# Patient Record
Sex: MALE | Race: WHITE | URBAN - METROPOLITAN AREA
[De-identification: names, ages, dates, MRNs, and addresses within clinical notes are randomized per-mention and may not be internally consistent; named-entity substitution may affect disease eponyms.]

---

## 2021-04-28 ENCOUNTER — OFFICE VISIT (OUTPATIENT)
Dept: URBAN - METROPOLITAN AREA CLINIC 91 | Facility: CLINIC | Age: 76
End: 2021-04-28
Payer: MEDICARE

## 2021-04-28 DIAGNOSIS — H43.813 VITREOUS DEGENERATION, BILATERAL: ICD-10-CM

## 2021-04-28 DIAGNOSIS — H35.033 HYPERTENSIVE RETINOPATHY, BILATERAL: Primary | ICD-10-CM

## 2021-04-28 DIAGNOSIS — H25.13 AGE-RELATED NUCLEAR CATARACT, BILATERAL: ICD-10-CM

## 2021-04-28 DIAGNOSIS — H52.4 PRESBYOPIA: ICD-10-CM

## 2021-04-28 DIAGNOSIS — H02.834 DERMATOCHALASIS OF LEFT UPPER EYELID: ICD-10-CM

## 2021-04-28 DIAGNOSIS — H02.831 DERMATOCHALASIS OF RIGHT UPPER EYELID: ICD-10-CM

## 2021-04-28 PROCEDURE — 99214 OFFICE O/P EST MOD 30 MIN: CPT | Performed by: OPHTHALMOLOGY

## 2021-04-28 ASSESSMENT — INTRAOCULAR PRESSURE
OD: 14
OS: 12

## 2021-04-28 ASSESSMENT — VISUAL ACUITY
OS: 20/25
OD: 20/20

## 2021-05-21 ENCOUNTER — OFFICE VISIT (OUTPATIENT)
Dept: URBAN - METROPOLITAN AREA CLINIC 91 | Facility: CLINIC | Age: 76
End: 2021-05-21
Payer: MEDICARE

## 2021-05-21 PROCEDURE — V2799 MISC VISION ITEM OR SERVICE: HCPCS | Performed by: OPHTHALMOLOGY

## 2021-05-21 ASSESSMENT — VISUAL ACUITY
OS: 20/25
OD: 20/20

## 2021-07-29 ENCOUNTER — ANESTHESIA (OUTPATIENT)
Dept: SURGERY | Facility: MEDICAL CENTER | Age: 76
DRG: 335 | End: 2021-07-29
Payer: MEDICARE

## 2021-07-29 ENCOUNTER — ANESTHESIA EVENT (OUTPATIENT)
Dept: SURGERY | Facility: MEDICAL CENTER | Age: 76
DRG: 335 | End: 2021-07-29
Payer: MEDICARE

## 2021-07-29 ENCOUNTER — HOSPITAL ENCOUNTER (INPATIENT)
Facility: MEDICAL CENTER | Age: 76
LOS: 4 days | DRG: 335 | End: 2021-08-02
Attending: INTERNAL MEDICINE | Admitting: STUDENT IN AN ORGANIZED HEALTH CARE EDUCATION/TRAINING PROGRAM
Payer: MEDICARE

## 2021-07-29 ENCOUNTER — HOSPITAL ENCOUNTER (OUTPATIENT)
Dept: RADIOLOGY | Facility: MEDICAL CENTER | Age: 76
End: 2021-07-29

## 2021-07-29 ENCOUNTER — APPOINTMENT (OUTPATIENT)
Dept: RADIOLOGY | Facility: MEDICAL CENTER | Age: 76
DRG: 335 | End: 2021-07-29
Attending: STUDENT IN AN ORGANIZED HEALTH CARE EDUCATION/TRAINING PROGRAM
Payer: MEDICARE

## 2021-07-29 DIAGNOSIS — R53.1 WEAKNESS: ICD-10-CM

## 2021-07-29 DIAGNOSIS — K56.609 SMALL BOWEL OBSTRUCTION (HCC): ICD-10-CM

## 2021-07-29 PROBLEM — G89.29 CHRONIC BACK PAIN: Status: ACTIVE | Noted: 2021-07-29

## 2021-07-29 PROBLEM — G47.00 INSOMNIA: Status: ACTIVE | Noted: 2021-07-29

## 2021-07-29 PROBLEM — J96.01 ACUTE RESPIRATORY FAILURE WITH HYPOXIA (HCC): Status: ACTIVE | Noted: 2021-07-29

## 2021-07-29 PROBLEM — D72.829 LEUKOCYTOSIS: Status: ACTIVE | Noted: 2021-07-29

## 2021-07-29 PROBLEM — M54.9 CHRONIC BACK PAIN: Status: ACTIVE | Noted: 2021-07-29

## 2021-07-29 PROBLEM — A41.9 SEPSIS (HCC): Status: ACTIVE | Noted: 2021-07-29

## 2021-07-29 LAB
ALBUMIN SERPL BCP-MCNC: 3.8 G/DL (ref 3.2–4.9)
ALBUMIN/GLOB SERPL: 1.3 G/DL
ALP SERPL-CCNC: 85 U/L (ref 30–99)
ALT SERPL-CCNC: 19 U/L (ref 2–50)
ANION GAP SERPL CALC-SCNC: 9 MMOL/L (ref 7–16)
APPEARANCE UR: CLEAR
AST SERPL-CCNC: 19 U/L (ref 12–45)
BASOPHILS # BLD AUTO: 0.3 % (ref 0–1.8)
BASOPHILS # BLD: 0.03 K/UL (ref 0–0.12)
BILIRUB SERPL-MCNC: 0.8 MG/DL (ref 0.1–1.5)
BILIRUB UR QL STRIP.AUTO: ABNORMAL
BUN SERPL-MCNC: 41 MG/DL (ref 8–22)
CALCIUM SERPL-MCNC: 8.9 MG/DL (ref 8.5–10.5)
CHLORIDE SERPL-SCNC: 105 MMOL/L (ref 96–112)
CO2 SERPL-SCNC: 25 MMOL/L (ref 20–33)
COLOR UR: ABNORMAL
CREAT SERPL-MCNC: 0.88 MG/DL (ref 0.5–1.4)
EKG IMPRESSION: NORMAL
EOSINOPHIL # BLD AUTO: 0.02 K/UL (ref 0–0.51)
EOSINOPHIL NFR BLD: 0.2 % (ref 0–6.9)
ERYTHROCYTE [DISTWIDTH] IN BLOOD BY AUTOMATED COUNT: 46.4 FL (ref 35.9–50)
FLUAV RNA SPEC QL NAA+PROBE: NEGATIVE
FLUBV RNA SPEC QL NAA+PROBE: NEGATIVE
GLOBULIN SER CALC-MCNC: 3 G/DL (ref 1.9–3.5)
GLUCOSE BLD-MCNC: 140 MG/DL (ref 65–99)
GLUCOSE BLD-MCNC: 142 MG/DL (ref 65–99)
GLUCOSE BLD-MCNC: 145 MG/DL (ref 65–99)
GLUCOSE SERPL-MCNC: 139 MG/DL (ref 65–99)
GLUCOSE UR STRIP.AUTO-MCNC: NEGATIVE MG/DL
HCT VFR BLD AUTO: 52.3 % (ref 42–52)
HGB BLD-MCNC: 17.3 G/DL (ref 14–18)
IMM GRANULOCYTES # BLD AUTO: 0.05 K/UL (ref 0–0.11)
IMM GRANULOCYTES NFR BLD AUTO: 0.6 % (ref 0–0.9)
INR PPP: 1.06 (ref 0.87–1.13)
KETONES UR STRIP.AUTO-MCNC: ABNORMAL MG/DL
LACTATE BLD-SCNC: 1.5 MMOL/L (ref 0.5–2)
LACTATE BLD-SCNC: 1.6 MMOL/L (ref 0.5–2)
LACTATE BLD-SCNC: 1.7 MMOL/L (ref 0.5–2)
LACTATE BLD-SCNC: 1.8 MMOL/L (ref 0.5–2)
LEUKOCYTE ESTERASE UR QL STRIP.AUTO: NEGATIVE
LYMPHOCYTES # BLD AUTO: 0.42 K/UL (ref 1–4.8)
LYMPHOCYTES NFR BLD: 4.7 % (ref 22–41)
MAGNESIUM SERPL-MCNC: 2.2 MG/DL (ref 1.5–2.5)
MAGNESIUM SERPL-MCNC: 2.2 MG/DL (ref 1.5–2.5)
MAGNESIUM SERPL-MCNC: 2.3 MG/DL (ref 1.5–2.5)
MCH RBC QN AUTO: 29.3 PG (ref 27–33)
MCHC RBC AUTO-ENTMCNC: 33.1 G/DL (ref 33.7–35.3)
MCV RBC AUTO: 88.5 FL (ref 81.4–97.8)
MICRO URNS: ABNORMAL
MONOCYTES # BLD AUTO: 0.76 K/UL (ref 0–0.85)
MONOCYTES NFR BLD AUTO: 8.4 % (ref 0–13.4)
NEUTROPHILS # BLD AUTO: 7.75 K/UL (ref 1.82–7.42)
NEUTROPHILS NFR BLD: 85.8 % (ref 44–72)
NITRITE UR QL STRIP.AUTO: NEGATIVE
NRBC # BLD AUTO: 0 K/UL
NRBC BLD-RTO: 0 /100 WBC
PH UR STRIP.AUTO: 5.5 [PH] (ref 5–8)
PHOSPHATE SERPL-MCNC: 2.8 MG/DL (ref 2.5–4.5)
PHOSPHATE SERPL-MCNC: 2.9 MG/DL (ref 2.5–4.5)
PLATELET # BLD AUTO: 151 K/UL (ref 164–446)
PMV BLD AUTO: 9.8 FL (ref 9–12.9)
POTASSIUM SERPL-SCNC: 4.2 MMOL/L (ref 3.6–5.5)
PROCALCITONIN SERPL-MCNC: 0.69 NG/ML
PROT SERPL-MCNC: 6.8 G/DL (ref 6–8.2)
PROT UR QL STRIP: NEGATIVE MG/DL
PROTHROMBIN TIME: 13.5 SEC (ref 12–14.6)
RBC # BLD AUTO: 5.91 M/UL (ref 4.7–6.1)
RBC UR QL AUTO: NEGATIVE
RSV RNA SPEC QL NAA+PROBE: NEGATIVE
SARS-COV-2 RNA RESP QL NAA+PROBE: NOTDETECTED
SODIUM SERPL-SCNC: 139 MMOL/L (ref 135–145)
SP GR UR STRIP.AUTO: 1.04
SPECIMEN SOURCE: NORMAL
UROBILINOGEN UR STRIP.AUTO-MCNC: 0.2 MG/DL
WBC # BLD AUTO: 9 K/UL (ref 4.8–10.8)

## 2021-07-29 PROCEDURE — 80053 COMPREHEN METABOLIC PANEL: CPT

## 2021-07-29 PROCEDURE — C1765 ADHESION BARRIER: HCPCS | Performed by: SURGERY

## 2021-07-29 PROCEDURE — 0DN80ZZ RELEASE SMALL INTESTINE, OPEN APPROACH: ICD-10-PCS | Performed by: SURGERY

## 2021-07-29 PROCEDURE — 700101 HCHG RX REV CODE 250: Performed by: STUDENT IN AN ORGANIZED HEALTH CARE EDUCATION/TRAINING PROGRAM

## 2021-07-29 PROCEDURE — 93005 ELECTROCARDIOGRAM TRACING: CPT | Performed by: STUDENT IN AN ORGANIZED HEALTH CARE EDUCATION/TRAINING PROGRAM

## 2021-07-29 PROCEDURE — 501838 HCHG SUTURE GENERAL: Performed by: SURGERY

## 2021-07-29 PROCEDURE — 82962 GLUCOSE BLOOD TEST: CPT

## 2021-07-29 PROCEDURE — C9803 HOPD COVID-19 SPEC COLLECT: HCPCS | Performed by: SURGERY

## 2021-07-29 PROCEDURE — 83605 ASSAY OF LACTIC ACID: CPT | Mod: 91

## 2021-07-29 PROCEDURE — 84145 PROCALCITONIN (PCT): CPT

## 2021-07-29 PROCEDURE — 0241U HCHG SARS-COV-2 COVID-19 NFCT DS RESP RNA 4 TRGT MIC: CPT

## 2021-07-29 PROCEDURE — 700111 HCHG RX REV CODE 636 W/ 250 OVERRIDE (IP)

## 2021-07-29 PROCEDURE — 99223 1ST HOSP IP/OBS HIGH 75: CPT | Mod: AI | Performed by: STUDENT IN AN ORGANIZED HEALTH CARE EDUCATION/TRAINING PROGRAM

## 2021-07-29 PROCEDURE — 160031 HCHG SURGERY MINUTES - 1ST 30 MINS LEVEL 5: Performed by: SURGERY

## 2021-07-29 PROCEDURE — 87040 BLOOD CULTURE FOR BACTERIA: CPT

## 2021-07-29 PROCEDURE — 160042 HCHG SURGERY MINUTES - EA ADDL 1 MIN LEVEL 5: Performed by: SURGERY

## 2021-07-29 PROCEDURE — 85610 PROTHROMBIN TIME: CPT

## 2021-07-29 PROCEDURE — 700111 HCHG RX REV CODE 636 W/ 250 OVERRIDE (IP): Performed by: ANESTHESIOLOGY

## 2021-07-29 PROCEDURE — 36415 COLL VENOUS BLD VENIPUNCTURE: CPT

## 2021-07-29 PROCEDURE — A9270 NON-COVERED ITEM OR SERVICE: HCPCS | Performed by: STUDENT IN AN ORGANIZED HEALTH CARE EDUCATION/TRAINING PROGRAM

## 2021-07-29 PROCEDURE — 84100 ASSAY OF PHOSPHORUS: CPT | Mod: 91

## 2021-07-29 PROCEDURE — 3E0T3BZ INTRODUCTION OF ANESTHETIC AGENT INTO PERIPHERAL NERVES AND PLEXI, PERCUTANEOUS APPROACH: ICD-10-PCS | Performed by: SURGERY

## 2021-07-29 PROCEDURE — 700105 HCHG RX REV CODE 258: Performed by: STUDENT IN AN ORGANIZED HEALTH CARE EDUCATION/TRAINING PROGRAM

## 2021-07-29 PROCEDURE — 160036 HCHG PACU - EA ADDL 30 MINS PHASE I: Performed by: SURGERY

## 2021-07-29 PROCEDURE — 81003 URINALYSIS AUTO W/O SCOPE: CPT

## 2021-07-29 PROCEDURE — 93010 ELECTROCARDIOGRAM REPORT: CPT | Performed by: INTERNAL MEDICINE

## 2021-07-29 PROCEDURE — 85025 COMPLETE CBC W/AUTO DIFF WBC: CPT

## 2021-07-29 PROCEDURE — 770001 HCHG ROOM/CARE - MED/SURG/GYN PRIV*

## 2021-07-29 PROCEDURE — 160048 HCHG OR STATISTICAL LEVEL 1-5: Performed by: SURGERY

## 2021-07-29 PROCEDURE — 83735 ASSAY OF MAGNESIUM: CPT | Mod: 91

## 2021-07-29 PROCEDURE — 700111 HCHG RX REV CODE 636 W/ 250 OVERRIDE (IP): Performed by: STUDENT IN AN ORGANIZED HEALTH CARE EDUCATION/TRAINING PROGRAM

## 2021-07-29 PROCEDURE — 700102 HCHG RX REV CODE 250 W/ 637 OVERRIDE(OP): Performed by: STUDENT IN AN ORGANIZED HEALTH CARE EDUCATION/TRAINING PROGRAM

## 2021-07-29 PROCEDURE — 160035 HCHG PACU - 1ST 60 MINS PHASE I: Performed by: SURGERY

## 2021-07-29 PROCEDURE — 700101 HCHG RX REV CODE 250: Performed by: ANESTHESIOLOGY

## 2021-07-29 PROCEDURE — 700111 HCHG RX REV CODE 636 W/ 250 OVERRIDE (IP): Performed by: SURGERY

## 2021-07-29 PROCEDURE — 64488 TAP BLOCK BI INJECTION: CPT | Performed by: SURGERY

## 2021-07-29 PROCEDURE — 160009 HCHG ANES TIME/MIN: Performed by: SURGERY

## 2021-07-29 PROCEDURE — 160002 HCHG RECOVERY MINUTES (STAT): Performed by: SURGERY

## 2021-07-29 RX ORDER — ONDANSETRON 2 MG/ML
INJECTION INTRAMUSCULAR; INTRAVENOUS PRN
Status: DISCONTINUED | OUTPATIENT
Start: 2021-07-29 | End: 2021-07-29 | Stop reason: SURG

## 2021-07-29 RX ORDER — CEFAZOLIN SODIUM 1 G/3ML
INJECTION, POWDER, FOR SOLUTION INTRAMUSCULAR; INTRAVENOUS PRN
Status: DISCONTINUED | OUTPATIENT
Start: 2021-07-29 | End: 2021-07-29 | Stop reason: SURG

## 2021-07-29 RX ORDER — AMOXICILLIN 250 MG
2 CAPSULE ORAL 2 TIMES DAILY
Status: DISCONTINUED | OUTPATIENT
Start: 2021-07-29 | End: 2021-08-02 | Stop reason: HOSPADM

## 2021-07-29 RX ORDER — SUCCINYLCHOLINE CHLORIDE 20 MG/ML
INJECTION INTRAMUSCULAR; INTRAVENOUS PRN
Status: DISCONTINUED | OUTPATIENT
Start: 2021-07-29 | End: 2021-07-29 | Stop reason: SURG

## 2021-07-29 RX ORDER — SODIUM CHLORIDE, SODIUM LACTATE, POTASSIUM CHLORIDE, CALCIUM CHLORIDE 600; 310; 30; 20 MG/100ML; MG/100ML; MG/100ML; MG/100ML
INJECTION, SOLUTION INTRAVENOUS CONTINUOUS
Status: DISCONTINUED | OUTPATIENT
Start: 2021-07-29 | End: 2021-07-29 | Stop reason: HOSPADM

## 2021-07-29 RX ORDER — GABAPENTIN 400 MG/1
800 CAPSULE ORAL DAILY
COMMUNITY

## 2021-07-29 RX ORDER — LABETALOL HYDROCHLORIDE 5 MG/ML
5 INJECTION, SOLUTION INTRAVENOUS
Status: DISCONTINUED | OUTPATIENT
Start: 2021-07-29 | End: 2021-07-29 | Stop reason: HOSPADM

## 2021-07-29 RX ORDER — DEXTROSE AND SODIUM CHLORIDE 5; .9 G/100ML; G/100ML
INJECTION, SOLUTION INTRAVENOUS CONTINUOUS
Status: DISCONTINUED | OUTPATIENT
Start: 2021-07-29 | End: 2021-07-29

## 2021-07-29 RX ORDER — ACETAMINOPHEN 325 MG/1
650 TABLET ORAL EVERY 6 HOURS PRN
Status: DISCONTINUED | OUTPATIENT
Start: 2021-07-29 | End: 2021-08-02 | Stop reason: HOSPADM

## 2021-07-29 RX ORDER — M-VIT,TX,IRON,MINS/CALC/FOLIC 27MG-0.4MG
1 TABLET ORAL DAILY
COMMUNITY

## 2021-07-29 RX ORDER — KETAMINE HYDROCHLORIDE 50 MG/ML
INJECTION, SOLUTION INTRAMUSCULAR; INTRAVENOUS PRN
Status: DISCONTINUED | OUTPATIENT
Start: 2021-07-29 | End: 2021-07-29 | Stop reason: SURG

## 2021-07-29 RX ORDER — HYDROMORPHONE HYDROCHLORIDE 1 MG/ML
0.4 INJECTION, SOLUTION INTRAMUSCULAR; INTRAVENOUS; SUBCUTANEOUS
Status: DISCONTINUED | OUTPATIENT
Start: 2021-07-29 | End: 2021-07-29 | Stop reason: HOSPADM

## 2021-07-29 RX ORDER — HYDRALAZINE HYDROCHLORIDE 20 MG/ML
5 INJECTION INTRAMUSCULAR; INTRAVENOUS
Status: DISCONTINUED | OUTPATIENT
Start: 2021-07-29 | End: 2021-07-29 | Stop reason: HOSPADM

## 2021-07-29 RX ORDER — MAGNESIUM OXIDE 400 MG/1
400 TABLET ORAL DAILY
COMMUNITY

## 2021-07-29 RX ORDER — POLYETHYLENE GLYCOL 3350 17 G/17G
1 POWDER, FOR SOLUTION ORAL
Status: DISCONTINUED | OUTPATIENT
Start: 2021-07-29 | End: 2021-08-02 | Stop reason: HOSPADM

## 2021-07-29 RX ORDER — HYDROMORPHONE HYDROCHLORIDE 1 MG/ML
INJECTION, SOLUTION INTRAMUSCULAR; INTRAVENOUS; SUBCUTANEOUS
Status: COMPLETED
Start: 2021-07-29 | End: 2021-07-29

## 2021-07-29 RX ORDER — HYDROMORPHONE HYDROCHLORIDE 1 MG/ML
0.1 INJECTION, SOLUTION INTRAMUSCULAR; INTRAVENOUS; SUBCUTANEOUS
Status: DISCONTINUED | OUTPATIENT
Start: 2021-07-29 | End: 2021-07-29 | Stop reason: HOSPADM

## 2021-07-29 RX ORDER — ENALAPRILAT 1.25 MG/ML
1.25 INJECTION INTRAVENOUS EVERY 6 HOURS PRN
Status: DISCONTINUED | OUTPATIENT
Start: 2021-07-29 | End: 2021-08-02 | Stop reason: HOSPADM

## 2021-07-29 RX ORDER — ASPIRIN 81 MG/1
81 TABLET, CHEWABLE ORAL DAILY
Status: DISCONTINUED | OUTPATIENT
Start: 2021-07-29 | End: 2021-07-29

## 2021-07-29 RX ORDER — ONDANSETRON 2 MG/ML
4 INJECTION INTRAMUSCULAR; INTRAVENOUS
Status: DISCONTINUED | OUTPATIENT
Start: 2021-07-29 | End: 2021-07-29 | Stop reason: HOSPADM

## 2021-07-29 RX ORDER — HEPARIN SODIUM 5000 [USP'U]/ML
5000 INJECTION, SOLUTION INTRAVENOUS; SUBCUTANEOUS EVERY 8 HOURS
Status: DISCONTINUED | OUTPATIENT
Start: 2021-07-29 | End: 2021-07-29

## 2021-07-29 RX ORDER — DEXTROSE MONOHYDRATE 25 G/50ML
50 INJECTION, SOLUTION INTRAVENOUS
Status: DISCONTINUED | OUTPATIENT
Start: 2021-07-29 | End: 2021-08-02 | Stop reason: HOSPADM

## 2021-07-29 RX ORDER — DEXAMETHASONE SODIUM PHOSPHATE 4 MG/ML
INJECTION, SOLUTION INTRA-ARTICULAR; INTRALESIONAL; INTRAMUSCULAR; INTRAVENOUS; SOFT TISSUE PRN
Status: DISCONTINUED | OUTPATIENT
Start: 2021-07-29 | End: 2021-07-29 | Stop reason: SURG

## 2021-07-29 RX ORDER — ZOLPIDEM TARTRATE 5 MG/1
5 TABLET ORAL NIGHTLY PRN
Status: DISCONTINUED | OUTPATIENT
Start: 2021-07-29 | End: 2021-08-02 | Stop reason: HOSPADM

## 2021-07-29 RX ORDER — SODIUM CHLORIDE, SODIUM LACTATE, POTASSIUM CHLORIDE, CALCIUM CHLORIDE 600; 310; 30; 20 MG/100ML; MG/100ML; MG/100ML; MG/100ML
INJECTION, SOLUTION INTRAVENOUS CONTINUOUS
Status: DISCONTINUED | OUTPATIENT
Start: 2021-07-29 | End: 2021-07-31

## 2021-07-29 RX ORDER — CYCLOBENZAPRINE HCL 10 MG
10 TABLET ORAL
COMMUNITY

## 2021-07-29 RX ORDER — HYDROMORPHONE HYDROCHLORIDE 1 MG/ML
1 INJECTION, SOLUTION INTRAMUSCULAR; INTRAVENOUS; SUBCUTANEOUS
Status: DISCONTINUED | OUTPATIENT
Start: 2021-07-29 | End: 2021-08-02 | Stop reason: HOSPADM

## 2021-07-29 RX ORDER — LABETALOL HYDROCHLORIDE 5 MG/ML
10 INJECTION, SOLUTION INTRAVENOUS EVERY 4 HOURS PRN
Status: DISCONTINUED | OUTPATIENT
Start: 2021-07-29 | End: 2021-08-02 | Stop reason: HOSPADM

## 2021-07-29 RX ORDER — HYDROMORPHONE HYDROCHLORIDE 1 MG/ML
0.2 INJECTION, SOLUTION INTRAMUSCULAR; INTRAVENOUS; SUBCUTANEOUS
Status: DISCONTINUED | OUTPATIENT
Start: 2021-07-29 | End: 2021-07-29 | Stop reason: HOSPADM

## 2021-07-29 RX ORDER — SODIUM CHLORIDE, SODIUM LACTATE, POTASSIUM CHLORIDE, AND CALCIUM CHLORIDE .6; .31; .03; .02 G/100ML; G/100ML; G/100ML; G/100ML
500 INJECTION, SOLUTION INTRAVENOUS
Status: DISCONTINUED | OUTPATIENT
Start: 2021-07-29 | End: 2021-08-02 | Stop reason: HOSPADM

## 2021-07-29 RX ORDER — MONTELUKAST SODIUM 10 MG/1
10 TABLET ORAL DAILY
COMMUNITY

## 2021-07-29 RX ORDER — PANTOPRAZOLE SODIUM 40 MG/1
40 TABLET, DELAYED RELEASE ORAL DAILY
COMMUNITY

## 2021-07-29 RX ORDER — LIDOCAINE HYDROCHLORIDE 20 MG/ML
INJECTION, SOLUTION EPIDURAL; INFILTRATION; INTRACAUDAL; PERINEURAL PRN
Status: DISCONTINUED | OUTPATIENT
Start: 2021-07-29 | End: 2021-07-29 | Stop reason: SURG

## 2021-07-29 RX ORDER — ASPIRIN 81 MG/1
81 TABLET, CHEWABLE ORAL DAILY
COMMUNITY

## 2021-07-29 RX ORDER — CHLORAL HYDRATE 500 MG
1000 CAPSULE ORAL
COMMUNITY

## 2021-07-29 RX ORDER — BUPIVACAINE HYDROCHLORIDE AND EPINEPHRINE 2.5; 5 MG/ML; UG/ML
INJECTION, SOLUTION EPIDURAL; INFILTRATION; INTRACAUDAL; PERINEURAL
Status: COMPLETED | OUTPATIENT
Start: 2021-07-29 | End: 2021-07-29

## 2021-07-29 RX ORDER — ZINC SULFATE 50(220)MG
25 CAPSULE ORAL DAILY
COMMUNITY

## 2021-07-29 RX ORDER — GABAPENTIN 400 MG/1
800 CAPSULE ORAL DAILY
Status: DISCONTINUED | OUTPATIENT
Start: 2021-07-29 | End: 2021-08-02 | Stop reason: HOSPADM

## 2021-07-29 RX ORDER — ONDANSETRON 4 MG/1
4 TABLET, ORALLY DISINTEGRATING ORAL EVERY 4 HOURS PRN
Status: DISCONTINUED | OUTPATIENT
Start: 2021-07-29 | End: 2021-08-02 | Stop reason: HOSPADM

## 2021-07-29 RX ORDER — MELOXICAM 7.5 MG/1
15 TABLET ORAL DAILY
Status: DISCONTINUED | OUTPATIENT
Start: 2021-07-29 | End: 2021-07-29

## 2021-07-29 RX ORDER — OXYCODONE HCL 5 MG/5 ML
5 SOLUTION, ORAL ORAL
Status: DISCONTINUED | OUTPATIENT
Start: 2021-07-29 | End: 2021-07-29 | Stop reason: HOSPADM

## 2021-07-29 RX ORDER — OXYCODONE HCL 5 MG/5 ML
10 SOLUTION, ORAL ORAL
Status: DISCONTINUED | OUTPATIENT
Start: 2021-07-29 | End: 2021-07-29 | Stop reason: HOSPADM

## 2021-07-29 RX ORDER — ONDANSETRON 2 MG/ML
4 INJECTION INTRAMUSCULAR; INTRAVENOUS EVERY 4 HOURS PRN
Status: DISCONTINUED | OUTPATIENT
Start: 2021-07-29 | End: 2021-08-02 | Stop reason: HOSPADM

## 2021-07-29 RX ORDER — MULTIVIT WITH MINERALS/LUTEIN
TABLET ORAL
COMMUNITY

## 2021-07-29 RX ORDER — BISACODYL 10 MG
10 SUPPOSITORY, RECTAL RECTAL
Status: DISCONTINUED | OUTPATIENT
Start: 2021-07-29 | End: 2021-08-02 | Stop reason: HOSPADM

## 2021-07-29 RX ORDER — MELOXICAM 7.5 MG/1
15 TABLET ORAL DAILY
Status: ON HOLD | COMMUNITY
End: 2021-08-02

## 2021-07-29 RX ADMIN — LIDOCAINE HYDROCHLORIDE 40 MG: 20 INJECTION, SOLUTION EPIDURAL; INFILTRATION; INTRACAUDAL at 12:26

## 2021-07-29 RX ADMIN — HYDROMORPHONE HYDROCHLORIDE 0.2 MG: 1 INJECTION, SOLUTION INTRAMUSCULAR; INTRAVENOUS; SUBCUTANEOUS at 13:42

## 2021-07-29 RX ADMIN — BUPIVACAINE HYDROCHLORIDE AND EPINEPHRINE 60 ML: 2.5; 5 INJECTION, SOLUTION EPIDURAL; INFILTRATION; INTRACAUDAL; PERINEURAL at 13:04

## 2021-07-29 RX ADMIN — CEFAZOLIN 2 G: 330 INJECTION, POWDER, FOR SOLUTION INTRAMUSCULAR; INTRAVENOUS at 12:29

## 2021-07-29 RX ADMIN — HYDROMORPHONE HYDROCHLORIDE 0.4 MG: 1 INJECTION, SOLUTION INTRAMUSCULAR; INTRAVENOUS; SUBCUTANEOUS at 13:52

## 2021-07-29 RX ADMIN — CEFTRIAXONE SODIUM 2 G: 10 INJECTION, POWDER, FOR SOLUTION INTRAVENOUS at 06:06

## 2021-07-29 RX ADMIN — SUGAMMADEX 200 MG: 100 INJECTION, SOLUTION INTRAVENOUS at 13:06

## 2021-07-29 RX ADMIN — PHENOL 1 SPRAY: 1.5 LIQUID ORAL at 08:05

## 2021-07-29 RX ADMIN — FENTANYL CITRATE 100 MCG: 50 INJECTION, SOLUTION INTRAMUSCULAR; INTRAVENOUS at 12:26

## 2021-07-29 RX ADMIN — DOCUSATE SODIUM 50 MG AND SENNOSIDES 8.6 MG 2 TABLET: 8.6; 5 TABLET, FILM COATED ORAL at 17:11

## 2021-07-29 RX ADMIN — HYDROMORPHONE HYDROCHLORIDE 0.4 MG: 1 INJECTION, SOLUTION INTRAMUSCULAR; INTRAVENOUS; SUBCUTANEOUS at 14:00

## 2021-07-29 RX ADMIN — HYDROMORPHONE HYDROCHLORIDE 0.4 MG: 1 INJECTION, SOLUTION INTRAMUSCULAR; INTRAVENOUS; SUBCUTANEOUS at 13:39

## 2021-07-29 RX ADMIN — METRONIDAZOLE 500 MG: 500 INJECTION, SOLUTION INTRAVENOUS at 22:41

## 2021-07-29 RX ADMIN — ROCURONIUM BROMIDE 40 MG: 10 INJECTION, SOLUTION INTRAVENOUS at 12:29

## 2021-07-29 RX ADMIN — DEXTROSE AND SODIUM CHLORIDE: 5; 900 INJECTION, SOLUTION INTRAVENOUS at 06:07

## 2021-07-29 RX ADMIN — HYDROMORPHONE HYDROCHLORIDE 0.4 MG: 1 INJECTION, SOLUTION INTRAMUSCULAR; INTRAVENOUS; SUBCUTANEOUS at 13:33

## 2021-07-29 RX ADMIN — SODIUM CHLORIDE, POTASSIUM CHLORIDE, SODIUM LACTATE AND CALCIUM CHLORIDE: 600; 310; 30; 20 INJECTION, SOLUTION INTRAVENOUS at 23:08

## 2021-07-29 RX ADMIN — HEPARIN SODIUM 5000 UNITS: 5000 INJECTION, SOLUTION INTRAVENOUS; SUBCUTANEOUS at 06:06

## 2021-07-29 RX ADMIN — PROPOFOL 100 MG: 10 INJECTION, EMULSION INTRAVENOUS at 12:26

## 2021-07-29 RX ADMIN — SODIUM CHLORIDE, POTASSIUM CHLORIDE, SODIUM LACTATE AND CALCIUM CHLORIDE: 600; 310; 30; 20 INJECTION, SOLUTION INTRAVENOUS at 08:04

## 2021-07-29 RX ADMIN — KETAMINE HYDROCHLORIDE 50 MG: 50 INJECTION INTRAMUSCULAR; INTRAVENOUS at 12:26

## 2021-07-29 RX ADMIN — HYDROMORPHONE HYDROCHLORIDE 0.2 MG: 1 INJECTION, SOLUTION INTRAMUSCULAR; INTRAVENOUS; SUBCUTANEOUS at 14:05

## 2021-07-29 RX ADMIN — SUCCINYLCHOLINE CHLORIDE 160 MG: 20 INJECTION, SOLUTION INTRAMUSCULAR; INTRAVENOUS; PARENTERAL at 12:26

## 2021-07-29 RX ADMIN — HYDROMORPHONE HYDROCHLORIDE 1 MG: 1 INJECTION, SOLUTION INTRAMUSCULAR; INTRAVENOUS; SUBCUTANEOUS at 23:02

## 2021-07-29 RX ADMIN — ONDANSETRON 4 MG: 2 INJECTION INTRAMUSCULAR; INTRAVENOUS at 13:02

## 2021-07-29 RX ADMIN — DEXAMETHASONE SODIUM PHOSPHATE 8 MG: 4 INJECTION, SOLUTION INTRA-ARTICULAR; INTRALESIONAL; INTRAMUSCULAR; INTRAVENOUS; SOFT TISSUE at 12:29

## 2021-07-29 RX ADMIN — FENTANYL CITRATE 50 MCG: 50 INJECTION INTRAMUSCULAR; INTRAVENOUS at 14:06

## 2021-07-29 RX ADMIN — METRONIDAZOLE 500 MG: 500 INJECTION, SOLUTION INTRAVENOUS at 06:06

## 2021-07-29 RX ADMIN — FENTANYL CITRATE 50 MCG: 50 INJECTION, SOLUTION INTRAMUSCULAR; INTRAVENOUS at 12:49

## 2021-07-29 RX ADMIN — METRONIDAZOLE 500 MG: 500 INJECTION, SOLUTION INTRAVENOUS at 15:23

## 2021-07-29 ASSESSMENT — ENCOUNTER SYMPTOMS
BRUISES/BLEEDS EASILY: 0
SPEECH CHANGE: 0
FLANK PAIN: 0
HEARTBURN: 1
BLURRED VISION: 0
DEPRESSION: 0
COUGH: 0
MYALGIAS: 0
SHORTNESS OF BREATH: 0
NAUSEA: 1
FALLS: 0
VOMITING: 1
ABDOMINAL PAIN: 1
DOUBLE VISION: 0
PALPITATIONS: 0
CHILLS: 0
HEADACHES: 0
DIARRHEA: 0
FOCAL WEAKNESS: 0
FEVER: 0
CONSTIPATION: 1
DIZZINESS: 0

## 2021-07-29 ASSESSMENT — LIFESTYLE VARIABLES
HAVE YOU EVER FELT YOU SHOULD CUT DOWN ON YOUR DRINKING: NO
AVERAGE NUMBER OF DAYS PER WEEK YOU HAVE A DRINK CONTAINING ALCOHOL: 0
TOTAL SCORE: 0
CONSUMPTION TOTAL: NEGATIVE
ALCOHOL_USE: NO
ON A TYPICAL DAY WHEN YOU DRINK ALCOHOL HOW MANY DRINKS DO YOU HAVE: 0
EVER HAD A DRINK FIRST THING IN THE MORNING TO STEADY YOUR NERVES TO GET RID OF A HANGOVER: NO
HAVE PEOPLE ANNOYED YOU BY CRITICIZING YOUR DRINKING: NO
TOTAL SCORE: 0
ON A TYPICAL DAY WHEN YOU DRINK ALCOHOL HOW MANY DRINKS DO YOU HAVE: 0
EVER FELT BAD OR GUILTY ABOUT YOUR DRINKING: NO
EVER HAD A DRINK FIRST THING IN THE MORNING TO STEADY YOUR NERVES TO GET RID OF A HANGOVER: NO
EVER FELT BAD OR GUILTY ABOUT YOUR DRINKING: NO
CONSUMPTION TOTAL: NEGATIVE
HOW MANY TIMES IN THE PAST YEAR HAVE YOU HAD 5 OR MORE DRINKS IN A DAY: 0
ALCOHOL_USE: NO
DOES PATIENT WANT TO STOP DRINKING: NO
HOW MANY TIMES IN THE PAST YEAR HAVE YOU HAD 5 OR MORE DRINKS IN A DAY: 0
AVERAGE NUMBER OF DAYS PER WEEK YOU HAVE A DRINK CONTAINING ALCOHOL: 0
TOTAL SCORE: 0
HAVE YOU EVER FELT YOU SHOULD CUT DOWN ON YOUR DRINKING: NO
HAVE PEOPLE ANNOYED YOU BY CRITICIZING YOUR DRINKING: NO
TOTAL SCORE: 0

## 2021-07-29 ASSESSMENT — COGNITIVE AND FUNCTIONAL STATUS - GENERAL
SUGGESTED CMS G CODE MODIFIER MOBILITY: CI
DAILY ACTIVITIY SCORE: 24
MOBILITY SCORE: 23
SUGGESTED CMS G CODE MODIFIER DAILY ACTIVITY: CH
CLIMB 3 TO 5 STEPS WITH RAILING: A LITTLE

## 2021-07-29 ASSESSMENT — PAIN DESCRIPTION - PAIN TYPE
TYPE: SURGICAL PAIN
TYPE: SURGICAL PAIN
TYPE: ACUTE PAIN
TYPE: SURGICAL PAIN
TYPE: ACUTE PAIN

## 2021-07-29 ASSESSMENT — PATIENT HEALTH QUESTIONNAIRE - PHQ9
SUM OF ALL RESPONSES TO PHQ9 QUESTIONS 1 AND 2: 0
2. FEELING DOWN, DEPRESSED, IRRITABLE, OR HOPELESS: NOT AT ALL
1. LITTLE INTEREST OR PLEASURE IN DOING THINGS: NOT AT ALL

## 2021-07-29 NOTE — H&P
Hospital Medicine History & Physical Note    Date of Service  7/29/2021    Primary Care Physician  No primary care provider on file.    Consultants  None    Code Status  Full Code    Chief Complaint  No chief complaint on file.    History of Presenting Illness  76M tsfer from [facility] by Dr. Burris [phone] for abdominal pain, distention nausea with vomiting, constipated without passing gas x 48 hours and a CT showing distal small bowel obstruction without free fluid or closed-loop or evidence of ischemic change. Patients' symptoms started 2 days ago and endorses epigastric tenderness. Wife bedside remarks he was much more distended before arrival at outside facility. NG tube suctioned out 1,000 L of gastric contents at outside facility and an additional 300mL during transport.    Upon admission, abnormal labs include tbili 1.9, wbc 18, HGB 18.89, glucose 165, bun 41 Cr 0.83, Lactic acid 2.5.    Will admit patient for SBO, consult surgery in AM, and leukocytosis concerning for infection.    I discussed the plan of care with patient.    Review of Systems  ROS  All systems reviewed and negative except as noted in HPI.    Past Medical History   has a past medical history of Moise's esophagus.    Surgical History   has a past surgical history that includes other abdominal surgery (2020).   Family History  family history includes Cancer in his brother, maternal grandmother, and sister.   Family history reviewed with patient. There is family history that is pertinent to the chief complaint.     Social History   reports that he has quit smoking. He has never used smokeless tobacco. He reports previous alcohol use. He reports that he does not use drugs.    Allergies  No Known Allergies    Medications  Prior to Admission Medications   Prescriptions Last Dose Informant Patient Reported? Taking?   Ascorbic Acid (VITAMIN C) 1000 MG Tab 7/28/2021 at Unknown time  Yes Yes   Sig: Take  by mouth.   Omega-3 Fatty  Acids (FISH OIL) 1000 MG Cap capsule 7/28/2021 at Unknown time  Yes Yes   Sig: Take 1,000 mg by mouth 1 time a day as needed.   Probiotic Product (FLORAJEN DIGESTION PO) 7/28/2021 at Unknown time  Yes Yes   Sig: Take  by mouth.   aspirin (ASA) 81 MG Chew Tab chewable tablet 7/28/2021 at Unknown time  Yes Yes   Sig: Chew 81 mg every day.   coenzyme Q-10 30 MG capsule 7/28/2021 at Unknown time  Yes Yes   Sig: Take  by mouth every day.   cyclobenzaprine (FLEXERIL) 10 mg Tab 7/28/2021 at Unknown time  Yes Yes   Sig: Take 10 mg by mouth 1 time a day as needed.   gabapentin (NEURONTIN) 400 MG Cap 7/28/2021 at Unknown time  Yes Yes   Sig: Take 800 mg by mouth every day.   magnesium oxide (MAG-OX) 400 MG Tab tablet 7/28/2021 at Unknown time  Yes Yes   Sig: Take 400 mg by mouth every day.   meloxicam (MOBIC) 7.5 MG Tab 7/28/2021 at Unknown time  Yes Yes   Sig: Take 15 mg by mouth every day.   montelukast (SINGULAIR) 10 MG Tab 7/28/2021 at Unknown time  Yes Yes   Sig: Take 10 mg by mouth every day.   pantoprazole (PROTONIX) 40 MG Tablet Delayed Response 7/28/2021 at Unknown time  Yes Yes   Sig: Take 40 mg by mouth every day.   therapeutic multivitamin-minerals (THERAGRAN-M) Tab 7/28/2021 at Unknown time  Yes Yes   Sig: Take 1 tablet by mouth every day.   zinc sulfate (ZINCATE) 220 (50 Zn) MG Cap 7/28/2021 at Unknown time  Yes Yes   Sig: Take 25 mg by mouth every day.      Facility-Administered Medications: None       Physical Exam  Temp:  [36.8 °C (98.2 °F)] 36.8 °C (98.2 °F)  Pulse:  [104] 104  Resp:  [14] 14  BP: (150)/(80) 150/80  SpO2:  [96 %] 96 %    Physical Exam  I have reviewed patients' vitals and Labs    Constitutional: Resting comfortably in NAD   HENT: Normocephalic, no obvious evidence of acute trauma. NG tube in place on low intermittent suction.  Eyes: No scleral icterus. Normal conjunctiva   Neck: Comfortable movement without any obvious restriction in the range of motion.  Cardiovascular: Upon ascultation I  appreciate a regular heart rhythm and a normal rate with no murmurs, rubs or gallops  Thorax & Lungs: No respiratory distress. No wheezing, rales or rhonchi heard on ausculation.  there is no obvious chest wall tenderness. I appreciate normal air movement throughout.   Abdomen: The abdomen is visibly distended with epigastric tenderness. No mass appreciated.  Skin: The exposed portions of skin reveal no obvious rash or other abnormalities.  Extremities/Musculoskeletal: no lower extremity edema with no asymmetry.  Neurologic: Alert & oriented. No focal deficits observed.   Psychiatric: Normal affect appropriate for the clinical situation.        Laboratory:          No results for input(s): ALTSGPT, ASTSGOT, ALKPHOSPHAT, TBILIRUBIN, DBILIRUBIN, GAMMAGT, AMYLASE, LIPASE, ALB, PREALBUMIN, GLUCOSE in the last 72 hours.      No results for input(s): NTPROBNP in the last 72 hours.      No results for input(s): TROPONINT in the last 72 hours.    Imaging:  DX-ABDOMEN FOR TUBE PLACEMENT    (Results Pending)       X-Ray:  I have personally reviewed the images and compared with prior images.  EKG:  I have personally reviewed the images and compared with prior images.    Assessment/Plan:  I anticipate this patient is appropriate for observation status at this time.    Sepsis (HCC)- (present on admission)  Assessment & Plan  This is Sepsis Present on admission  SIRS criteria identified on my evaluation include: Fever, with temperature greater than 101 deg F, Tachycardia, with heart rate greater than 90 BPM, Tachypnea, with respirations greater than 20 per minute, Leukocytosis, with WBC greater than 12,000 and Bandemia, greater than 10% bands  Source is SBO  Sepsis protocol initiated  Fluid resuscitation ordered per protocol  IV antibiotics as appropriate for source of sepsis  While organ dysfunction may be noted elsewhere in this problem list or in the chart, degree of organ dysfunction does not meet CMS criteria for severe  sepsis    Flagyl/Rocephin          Small bowel obstruction (HCC)- (present on admission)  Assessment & Plan  NPO  NG tube on intermittent suction  Surgery consult in AM hx of cholecystectomy in [place]  Bowel rest  Serial KUB      Insomnia- (present on admission)  Assessment & Plan  Benadryl/Ativan/melatonin    Chronic back pain- (present on admission)  Assessment & Plan  Pain management. Hold muscle relaxants for now.      VTE prophylaxis: SCDs/TEDs and heparin ppx

## 2021-07-29 NOTE — PROGRESS NOTES
Transfer Center Note    [facility] HOSPITALIST TRIAGE OFFICER DIRECT ADMISSION REPORT  Transferring facility: [facility]  Transferring physician: Dr Burris  Transferring facility/physician contact number: [phone]  Chief complaint: Abdominal pain, distal small bowel obstruction  Pertinent history & patient course: Patient reports abdominal pain distention, nausea without vomiting, constipated without passing gas x48 hours.  Pertinent imaging & lab results: CT reveals distal small bowel obstruction without free fluid or closed-loop or evidence of ischemic change  Code Status: Full code per transferring provider, I personally verified with the transferring provider patient's code status and the transferring provider has confirmed this with the patient.  Further work up or recommendations per triage officer prior to transfer: NG tube  Consultants called prior read 6 transfer and pertinent input from consultants: Discussed with general surgery Dr. Palomino  Patient accepted for transfer: Yes  Consultants to be called upon arrival: General surgery Dr. Palomino  Admission status: Inpatient.   [facility]  If ICU transfer, name of intensivist case discussed with and pertinent input from critical care:     Please inform the triage officer upon arrival of the patient to [facility] for assignment of a hospitalist to perform admission.     For any question or concerns regarding the care of this patient, please reach out to the assigned hospitalist.

## 2021-07-29 NOTE — OP REPORT
DATE OF SERVICE:  07/29/2021     PREOPERATIVE DIAGNOSIS:  Small-bowel obstruction.     POSTOPERATIVE DIAGNOSIS:  Small-bowel obstruction secondary to internal hernia   due to omental adhesion across the ileum.     OPERATION:  Exploratory laparotomy with reduction of internal hernia and   correction of volvulus with enterolysis.     SURGEON:  Zhou Landin MD     ANESTHESIOLOGIST:  Landen Westbrook MD     OPERATIVE NOTE:  The patient is a 76 years of age, presents with a small-bowel   obstruction, had previous laparoscopic surgery, but no open surgery.  The   patient had a complete obstruction on CT with no evidence of neoplasia.  The   patient was likely felt to have adhesions related small-bowel obstruction   unlikely to resolve with nasogastric decompression, cannot exclude the   possibility of small bowel tumor.  The patient was recommended to undergo   surgical intervention with exploratory laparotomy and consented to proceed.     DESCRIPTION OF PROCEDURE:  The patient was taken to the operating room, had   sequential stockings applied as anti-embolism prophylaxis.  Prophylactic   antibiotics were administered.  He was placed under anesthesia.  His abdomen   was prepped with ChloraPrep solution, sterile drapes were applied and a   time-out was affected.  A periumbilical midline incision was made and carried   down through skin and subcutaneous tissues to the level of fascia.  Hemostasis   was controlled with electrocautery.  The fascia was divided and the   peritoneum was divided.  The abdomen was entered.  There was nonbloody serous   fluid present within the abdominal cavity, which was evacuated.  The bowel was   eviscerated.  In the distal ileum, there was a single band adhesion from the   omentum across the bowel to the mesentery through which there was an internal   hernia and the volvulus.  The adhesive band was released.  The bowel appeared   to be viable.  There was no evidence of tumor.  The  patient had the viscera   returned to the abdominal cavity.  Seprafilm was used to cover the bowel and   the fascia was closed using interrupted Smead-Valente #1 Vicryl suture.  The   wound was irrigated and the skin was approximated using automatic stapling   device and the patient had a sterile dressing applied.  Dr. Westbrook plans to   do bilateral transversus abdominis blocks.  The patient will then be admitted   to postanesthesia recovery, returned to the surgical floor for postoperative   care.  Nasogastric decompression will continue, but likely can be discontinued   in the morning.  The patient tolerated the procedure well without apparent   complications.  Sponge, instrument, and needle counts reported as correct for   all phases of surgery.        ______________________________  MD JAYLA ORLANDO/KHLOE    DD:  07/29/2021 13:08  DT:  07/29/2021 14:34    Job#:  [id number]    CC:Landen Westbrook MD(User)

## 2021-07-29 NOTE — ASSESSMENT & PLAN NOTE
RESOLVED -- RA 8/1    Post op - 10-15L  On 2L 7/30 -- wean off  Elevated procal -- already on empiric abx

## 2021-07-29 NOTE — THERAPY
Physical Therapy Contact Note    PT consult received.  Pt w/ strict bed rest orders awaiting Sx consult.  Will f/u when appropriate.    Jay Gillespie PT, [phone]

## 2021-07-29 NOTE — CONSULTS
DATE OF SERVICE:  07/29/2021     REFERRING PHYSICIAN:  Doc Barnhart MD     REASON FOR CONSULTATION:  Small-bowel obstruction.     HISTORY OF PRESENT ILLNESS:  The patient, 76 years of age, presents with a   small-bowel obstruction.  He has been obstipated, had nausea and vomiting   despite nasogastric decompression for the last 48 hours.  He has not had   previous open abdominal surgery last year, he did require laparoscopic   cholecystectomy.  The patient has no abdominal wall hernias.  He does not have   symptoms of viral illness.  He does not appear to have provocation from food   poisoning.  The patient developed abdominal distention following eating   supper.  His bowels moved two days ago, but subsequently he has been   obstipated.  He has had hiccups.  He had an NG tube placed at an outside   hospital and was somewhat inexplicably referred to our hospital for care.  The   patient really does not have any significant serious medical illnesses and I   believe the hospital bed he has usually has surgical staff.  The patient was   seen and examined.  He does have some diffuse tenderness.  Of concern is white   count elevation of 18,000.  CT scan was reviewed and is consistent with   distal small-bowel obstruction.  He had colonoscopy in 2017, had one polyp   removed.  He has not had melena, hematochezia or chronic symptoms.  He has not   had symptoms suggestive of lymphoma.  There was no solid organ abnormality or   lymphadenopathy on CT.  The patient most likely has an obstruction secondary   to an adhesive band and appears to be a candidate for surgical intervention.     ALLERGIES:  He has no known drug allergies to medications.     CURRENT MEDICATIONS:  Included mostly multivitamins and then Flexeril and   Neurontin for low back pain, failed back syndrome.  He does take Mobic and   ____ Protonix.     He used to smoke, but does not smoke.  He does not use oxygen at home.     FAMILY HISTORY:  Positive for  cancer in his brother, maternal grandmother and   sister.     SOCIAL HISTORY:  The patient drinks rarely.     REVIEW OF SYSTEMS:  Otherwise negative and noncontributory.  He has no viral   symptoms.  It is not clear if the patient has been COVID tested.     PHYSICAL EXAMINATION:  VITAL SIGNS:  Essentially normal.  He does have a mild resting tachycardia.  HEENT:  Unremarkable.  He does have a nasogastric tube in place that is   draining bile.  NECK:  He has no palpable lymphadenopathy.  His thyroid is normal.  LUNGS:  Clear.  CARDIAC:  Normal.  ABDOMEN:  Slightly distended.  He does have some deep tenderness, but no   evidence of peritonitis.  There are no abdominal wall hernias.  EXTREMITIES:  Free of deformity.  NEUROLOGIC:  Neurologically he is intact.  I think he is hard of hearing, but   his affect is otherwise somewhat subdued.     LABORATORY DATA:  The patient's laboratories here are relatively benign.  He   apparently had a significant elevation of his hemoglobin and white count at   the outlying facility.     IMPRESSION:  At this time, a small-bowel obstruction.  I suspect the patient   in fact has a true obstruction from looking at his CT.  His white count is   elevated.  There could be compromised bowel.  The patient, I think, is a   candidate for surgical intervention today and we will pursue that.  The risks,   possible complications of operation will be explained to the patient in   detail prior to surgery and obtain his consent.        ______________________________  MD JAYLA ORLANDO/LUZ/RACHANA    DD:  07/29/2021 08:57  DT:  07/29/2021 10:01    Job#:  [id number]    CC:Doc Barnhart MD

## 2021-07-29 NOTE — THERAPY
Missed Therapy     Patient Name: Reynaldo Dodge  Age:  76 y.o., Sex:  male  Medical Record #: [id number]  Today's Date: 7/29/2021    Discussed missed therapy with RN        07/29/21 1005   Initial Contact Note    Initial Contact Note Order Received and Verified, Occupational Therapy Evaluation in Progress with Full Report to Follow.   Interdisciplinary Plan of Care Collaboration   IDT Collaboration with  Nursing   Collaboration Comments OT eval held per RN pt left for sx. OT will follow up as medically appropriate    Session Information   Date / Session Number  7/29 HOLD

## 2021-07-29 NOTE — ANESTHESIA PREPROCEDURE EVALUATION
Relevant Problems   Other   (positive) Chronic back pain   (positive) Sepsis (HCC)   (positive) Small bowel obstruction (HCC)     Anes H&P:  PAST MEDICAL HISTORY:   76 y.o. male who presents for Procedure(s):  LAPAROTOMY, EXPLORATORY.  He has current and past medical problems significant for:    Past Medical History:   Diagnosis Date   • Moise's esophagus     Per patient's wife       SMOKING/ALCOHOL/RECREATIONAL DRUG USE:  Social History     Tobacco Use   • Smoking status: Former Smoker   • Smokeless tobacco: Never Used   • Tobacco comment: Quit 50yrs ago   Vaping Use   • Vaping Use: Never used   Substance Use Topics   • Alcohol use: Not Currently     Comment: 0   • Drug use: Never     Social History     Substance and Sexual Activity   Drug Use Never       PAST SURGICAL HISTORY:  Past Surgical History:   Procedure Laterality Date   • OTHER ABDOMINAL SURGERY  2020    Gallbladder removed 1yr ago       ALLERGIES:   No Known Allergies    MEDICATIONS:  No current facility-administered medications on file prior to encounter.     Current Outpatient Medications on File Prior to Encounter   Medication Sig Dispense Refill   • meloxicam (MOBIC) 7.5 MG Tab Take 15 mg by mouth every day.     • gabapentin (NEURONTIN) 400 MG Cap Take 800 mg by mouth every day.     • pantoprazole (PROTONIX) 40 MG Tablet Delayed Response Take 40 mg by mouth every day.     • magnesium oxide (MAG-OX) 400 MG Tab tablet Take 400 mg by mouth every day.     • Omega-3 Fatty Acids (FISH OIL) 1000 MG Cap capsule Take 1,000 mg by mouth 1 time a day as needed.     • montelukast (SINGULAIR) 10 MG Tab Take 10 mg by mouth every day.     • coenzyme Q-10 30 MG capsule Take  by mouth every day.     • therapeutic multivitamin-minerals (THERAGRAN-M) Tab Take 1 tablet by mouth every day.     • Probiotic Product (FLORAJEN DIGESTION PO) Take  by mouth.     • cyclobenzaprine (FLEXERIL) 10 mg Tab Take 10 mg by mouth 1 time a day as needed.     • Ascorbic Acid  (VITAMIN C) 1000 MG Tab Take  by mouth.     • aspirin (ASA) 81 MG Chew Tab chewable tablet Chew 81 mg every day.     • zinc sulfate (ZINCATE) 220 (50 Zn) MG Cap Take 25 mg by mouth every day.         LABS:  No results found for: HEMOGLOBIN, HEMATOCRIT, WBC  No results found for: SODIUM, POTASSIUM, CHLORIDE, CO2, GLUCOSE, BUN, CALCIUM    COVID-19 Status: Vaccinated      PREVIOUS ANESTHETICS: See EMR  __________________________________________      Physical Exam    Airway   Mallampati: II  TM distance: >3 FB  Neck ROM: full       Cardiovascular - normal exam  Rhythm: regular  Rate: normal  (-) murmur     Dental - normal exam  (+) upper dentures, lower dentures           Pulmonary - normal exam  Breath sounds clear to auscultation     Abdominal    Neurological - normal exam                 Anesthesia Plan    ASA 4   ASA physical status 4 criteria: sepsis    Plan - general       Airway plan will be ETT          Induction: intravenous and rapid sequence    Postoperative Plan: Postoperative administration of opioids is intended.    Pertinent diagnostic labs and testing reviewed    Informed Consent:    Anesthetic plan and risks discussed with patient.    Use of blood products discussed with: patient whom consented to blood products.

## 2021-07-29 NOTE — OR SURGEON
Immediate Post OP Note    PreOp Diagnosis: SBO      PostOp Diagnosis: same Internal hernia       Procedure(s):  LAPAROTOMY, EXPLORATORY - Wound Class: Clean  LYSIS, ADHESIONS - Wound Class: Clean  REDUCTION OF INTERNAL HERNIA - Wound Class: Clean    Surgeon(s):  Zhou Landin M.D.    Anesthesiologist/Type of Anesthesia:  Anesthesiologist: Landen Westbrook M.D./General    Surgical Staff:  Circulator: Freda Barraza R.N.; Layla Joseph R.N.  Scrub Person: Soumya Rosen    Specimens removed if any:  * No specimens in log *    Estimated Blood Loss: min    Findings: single band  Bowel viable     Complications: 0        7/29/2021 1:03 PM Zhou Landin M.D.

## 2021-07-29 NOTE — CARE PLAN
Problem: Knowledge Deficit - Standard  Goal: Patient and family/care givers will demonstrate understanding of plan of care, disease process/condition, diagnostic tests and medications  Outcome: Progressing     Problem: Pain - Standard  Goal: Alleviation of pain or a reduction in pain to the patient’s comfort goal  Outcome: Progressing     The patient is Stable - Low risk of patient condition declining or worsening    Shift Goals  Clinical Goals: comfort   Patient Goals: comfort    Progress made toward(s) clinical / shift goals: Patient educated on 1-10 pain scale. NG tube in place. Extra blankets and pillows in place.     Patient is not progressing towards the following goals:

## 2021-07-29 NOTE — PROGRESS NOTES
Hospital Medicine Daily Progress Note    Date of Service  7/29/2021    Chief Complaint  Reynaldo Dodge is a 76 y.o. male admitted 7/29/2021 with nausea, vomiting, abdominal discomfort for 48 hours.    Hospital Course  Mr. Reynaldo Dodge is a 76-year-old male who had ERCP and lap cholecystectomy a 2 months prior to [facility], who presented to outstanding ER with complains of nausea vomit abdominal pain 40 hours.  He was found to have a distal small bowel obstruction, subsequently transferred to [facility] for surgical evaluation as [facility] had no bed available.    Interval Problem Update  7/29 I consulted Dr. Landin.  Patient is taken to the OR for surgical intervention today.  Continue empiric antibiotic coverage and IV fluid.  Postop pain control as per surgery.  Await surgical findings.    I have personally seen and examined the patient at bedside. I discussed the plan of care with patient and family.    Consultants/Specialty  Surgery (Dr. Landin)    Code Status  Full Code    Disposition  Patient is not medically cleared.   Anticipate discharge to to home with close outpatient follow-up.  I have placed the appropriate orders for post-discharge needs.    Review of Systems  Review of Systems   Constitutional: Positive for malaise/fatigue. Negative for chills and fever.   HENT: Negative for hearing loss and tinnitus.    Eyes: Negative for blurred vision and double vision.   Respiratory: Negative for cough and shortness of breath.    Cardiovascular: Negative for chest pain, palpitations and leg swelling.   Gastrointestinal: Positive for abdominal pain, constipation, heartburn, nausea and vomiting. Negative for diarrhea.   Genitourinary: Negative for dysuria and flank pain.   Musculoskeletal: Negative for falls and myalgias.   Skin: Negative for itching and rash.   Neurological: Negative for dizziness, speech change, focal weakness and headaches.   Endo/Heme/Allergies: Negative for  environmental allergies. Does not bruise/bleed easily.   Psychiatric/Behavioral: Negative for depression and suicidal ideas.   All other systems reviewed and are negative.       Physical Exam  Temp:  [36.2 °C (97.1 °F)-36.8 °C (98.2 °F)] 36.2 °C (97.1 °F)  Pulse:  [] 96  Resp:  [10-18] 10  BP: (114-162)/(68-89) 114/68  SpO2:  [94 %-98 %] 97 %    Physical Exam  Constitutional:       Appearance: He is ill-appearing.   HENT:      Head: Normocephalic and atraumatic.      Nose: Nose normal.      Comments: NG in place     Mouth/Throat:      Mouth: Mucous membranes are dry.      Pharynx: Oropharynx is clear.   Eyes:      General: No scleral icterus.     Extraocular Movements: Extraocular movements intact.   Cardiovascular:      Rate and Rhythm: Normal rate and regular rhythm.      Pulses: Normal pulses.      Heart sounds: No friction rub.   Pulmonary:      Comments: Decreased bibasilar breath sounds, mild inspiratory Rales  Abdominal:      Comments: Distended, generalized tenderness  No guarding, no rebound, BS present   Musculoskeletal:         General: Tenderness present. No swelling. Normal range of motion.      Cervical back: Neck supple. No tenderness.   Skin:     General: Skin is warm and dry.      Capillary Refill: Capillary refill takes 2 to 3 seconds.   Neurological:      General: No focal deficit present.      Mental Status: He is alert and oriented to person, place, and time.      Motor: No weakness.   Psychiatric:         Mood and Affect: Mood normal.         Fluids    Intake/Output Summary (Last 24 hours) at 7/29/2021 1443  Last data filed at 7/29/2021 1218  Gross per 24 hour   Intake 200 ml   Output 0 ml   Net 200 ml       Laboratory  Recent Labs     07/29/21  0859   WBC 9.0   RBC 5.91   HEMOGLOBIN 17.3   HEMATOCRIT 52.3*   MCV 88.5   MCH 29.3   MCHC 33.1*   RDW 46.4   PLATELETCT 151*   MPV 9.8     Recent Labs     07/29/21  0859   SODIUM 139   POTASSIUM 4.2   CHLORIDE 105   CO2 25   GLUCOSE 139*   BUN  41*   CREATININE 0.88   CALCIUM 8.9     Recent Labs     07/29/21  0508   INR 1.06               Imaging  OUTSIDE IMAGES-DX CHEST   Final Result      OUTSIDE IMAGES-CT ABDOMEN /PELVIS   Final Result      DX-ABDOMEN FOR TUBE PLACEMENT   Final Result      1.  Nasogastric tube tip terminates in the stomach region with the side port near the GE junction. Recommend mild enhancement.   2.  Dilated small bowel.           Assessment/Plan  * Small bowel obstruction (HCC)- (present on admission)  Assessment & Plan  Recent ERCP and lap cholecystectomy at [facility] 2 months prior to admission    CTAP notable for distal SBO  Bowel rest  NG LIS -- 1.2L bilious output so far  IVF  Pain control    Surgery f/u appreciated -- OR today 7/29      Acute respiratory failure with hypoxia (HCC)  Assessment & Plan  Post op - 10-15L  Wean off as tolerated    Insomnia- (present on admission)  Assessment & Plan  PRN Ambien    Chronic back pain- (present on admission)  Assessment & Plan  Gabapentin, other PRN pain meds ordered    Leukocytosis  Assessment & Plan  18k at another facility, resolved    Sepsis (HCC)- (present on admission)  Assessment & Plan  IVF, abx  Likely not true sepsis, but secondary to SBO, antibiotic for now, may de-escalate pending clinical course         VTE prophylaxis: SCDs/TEDs    I have performed a physical exam and reviewed and updated ROS and Plan today (7/29/2021). In review of yesterday's note (7/28/2021), there are no changes except as documented above.

## 2021-07-29 NOTE — ASSESSMENT & PLAN NOTE
Recent ERCP and lap cholecystectomy at [facility] 2 months prior to admission    CTAP notable for distal SBO  Bowel rest  NG clamped  IVF  Pain control    Surgery f/u appreciated  S/p exploratory laparotomy with lysis of adhesions and reduction of internal hernia 7/29/2021 by Dr. Landin  POD 1 - 7/30: pain controlled, persistent NG bilious output, no leukocytosis, IVF  POD 2 - 7/31: hypoglycemia - started on D5W-LR, VTE ppx with heparin SQ  POD 3 - 8/1: tolerating CLD, advance as per surgery

## 2021-07-29 NOTE — PROGRESS NOTES
0054: Received report from Hannah DESOUZA  0300: Patient arrived to unit via gurney. Pt ambulated to bed. Belongings at bedside.     Assessment complete.  A&O x 4. Patient calls appropriately.  Patient ambulates with SBA assist.   Patient has 5/10 pain. Pain managed with prescribed medications.  Denies N&V.  + void, - flatus, last BM PTA  Patient denies SOB.    NG tube L nare, CDI     Patient sitting in bed. Family at bedside. Review plan with of care with patient. Call light and personal belongings with in reach. Hourly rounding in place. All needs met at this time.

## 2021-07-29 NOTE — ANESTHESIA PROCEDURE NOTES
Peripheral Block    Date/Time: 7/29/2021 1:04 PM  Performed by: Landen Westbrook M.D.  Authorized by: Landen Westbrook M.D.     Patient Location:  OR  Start Time:  7/29/2021 1:04 PM  Reason for Block: at surgeon's request and post-op pain management ONLY    patient identified, IV checked, site marked, risks and benefits discussed, surgical consent, monitors and equipment checked, pre-op evaluation and timeout performed    Patient Position:  Supine  Prep: ChloraPrep    Monitoring:  Heart rate, continuous pulse ox and cardiac monitor  Block Region:  Trunk  Trunk - Block Type:  Rectus sheath plane block - TAP block    Laterality:  Bilateral  Procedures: ultrasound guided  Image captured, interpreted and electronically stored.  Block Type:  Single-shot  Needle Length:  100mm  Needle Gauge:  21 G  Needle Localization:  Ultrasound guidance  Injection Assessment:  Negative aspiration for heme, no paresthesia on injection, incremental injection and local visualized surrounding nerve on ultrasound  Evidence of intravascular injection: No

## 2021-07-29 NOTE — CARE PLAN
The patient is Watcher - Medium risk of patient condition declining or worsening    Shift Goals  Clinical Goals: bowel movement  Patient Goals: comfort    Progress made toward(s) clinical / shift goals:  pt is more comfortable but has not had BM    Problem: Knowledge Deficit - Standard  Goal: Patient and family/care givers will demonstrate understanding of plan of care, disease process/condition, diagnostic tests and medications  Outcome: Progressing     Problem: Pain - Standard  Goal: Alleviation of pain or a reduction in pain to the patient’s comfort goal  Outcome: Progressing       Patient is not progressing towards the following goals:

## 2021-07-29 NOTE — ANESTHESIA POSTPROCEDURE EVALUATION
Patient: Reynaldo Dodge    Procedure Summary     Date: 07/29/21 Room / Location: [facility] 09 / SURGERY [facility]    Anesthesia Start: 1218 Anesthesia Stop: 1320    Procedures:       LAPAROTOMY, EXPLORATORY (Abdomen)      LYSIS, ADHESIONS (Abdomen)      REDUCTION OF INTERNAL HERNIA (Abdomen) Diagnosis: (SMALL BOWEL OBSTRUCTION )    Surgeons: Zhou Landin M.D. Responsible Provider: Landen Westbrook M.D.    Anesthesia Type: general ASA Status: 4          Final Anesthesia Type: general  Last vitals  BP   Blood Pressure : 114/68    Temp   36.2 °C (97.1 °F)    Pulse   96   Resp   (!) 10    SpO2   97 %      Anesthesia Post Evaluation    Patient location during evaluation: PACU  Patient participation: complete - patient participated  Level of consciousness: sleepy but conscious    Airway patency: patent  Anesthetic complications: no  Cardiovascular status: hemodynamically stable  Respiratory status: acceptable  Hydration status: balanced    PONV: none          No complications documented.     Nurse Pain Score: 10 (NPRS)

## 2021-07-29 NOTE — ANESTHESIA PROCEDURE NOTES
Airway    Date/Time: 7/29/2021 12:26 PM  Performed by: Landen Westbrook M.D.  Authorized by: Landen Westbrook M.D.     Location:  OR  Urgency:  Elective  Difficult Airway: No    Indications for Airway Management:  Anesthesia      Spontaneous Ventilation: absent    Sedation Level:  Deep  Preoxygenated: Yes    Patient Position:  Sniffing  Mask Difficulty Assessment:  0 - not attempted  Final Airway Type:  Endotracheal airway  Final Endotracheal Airway:  ETT  Cuffed: Yes    Technique Used for Successful ETT Placement:  Direct laryngoscopy  Devices/Methods Used in Placement:  Cricoid pressure    Insertion Site:  Oral  Blade Type:  Walker  Laryngoscope Blade/Videolaryngoscope Blade Size:  2  ETT Size (mm):  8.0  Measured from:  Teeth  ETT to Teeth (cm):  23  Placement Verified by: auscultation and capnometry    Cormack-Lehane Classification:  Grade I - full view of glottis  Number of Attempts at Approach:  1

## 2021-07-29 NOTE — PROGRESS NOTES
4 Eyes Skin Assessment Completed by Kia Orr, RN and Debbie Peters, DEO.    Head WDL  Ears WDL  Nose WDL  Mouth WDL  Neck WDL  Breast/Chest WDL  Shoulder Blades WDL  Spine WDL  (R) Arm/Elbow/Hand WDL  (L) Arm/Elbow/Hand WDL  Abdomen WDL  Groin WDL  Scrotum/Coccyx/Buttocks WDL  (R) Leg WDL  (L) Leg WDL  (R) Heel/Foot/Toe WDL  (L) Heel/Foot/Toe WDL          Devices In Places Pulse Ox, OG/NG and Nasal Cannula      Interventions In Place Pillows    Possible Skin Injury No    Pictures Uploaded Into Epic N/A  Wound Consult Placed N/A  RN Wound Prevention Protocol Ordered No

## 2021-07-29 NOTE — PROGRESS NOTES
Patient AO x 4, RASS -1, and had 10/10 pain treated with IV analgesics.  Surgical dressing CDI.  VSS on 2L.  Stating pain is tolerable at time of transfer.  POC reviewed, PIV verified, and safety protocols in place.  Report to Kulwinder DESOUZA T4.

## 2021-07-29 NOTE — ANESTHESIA TIME REPORT
Anesthesia Start and Stop Event Times     Date Time Event    7/29/2021 1044 Ready for Procedure     1218 Anesthesia Start     1320 Anesthesia Stop        Responsible Staff  07/29/21    Name Role Begin End    Landen Westbrook M.D. Anesth 1218 1320        Preop Diagnosis (Free Text):  Pre-op Diagnosis     SMALL BOWEL OBSTRUCTION         Preop Diagnosis (Codes):    Post op Diagnosis  Small bowel obstruction (HCC)      Premium Reason  Non-Premium    Comments:

## 2021-07-30 PROBLEM — E86.0 DEHYDRATION: Status: ACTIVE | Noted: 2021-07-30

## 2021-07-30 PROBLEM — E44.0 MODERATE PROTEIN-CALORIE MALNUTRITION (HCC): Status: ACTIVE | Noted: 2021-07-30

## 2021-07-30 LAB
ALBUMIN SERPL BCP-MCNC: 3.5 G/DL (ref 3.2–4.9)
ALBUMIN/GLOB SERPL: 1.3 G/DL
ALP SERPL-CCNC: 65 U/L (ref 30–99)
ALT SERPL-CCNC: 18 U/L (ref 2–50)
ANION GAP SERPL CALC-SCNC: 10 MMOL/L (ref 7–16)
AST SERPL-CCNC: 27 U/L (ref 12–45)
BILIRUB SERPL-MCNC: 0.8 MG/DL (ref 0.1–1.5)
BUN SERPL-MCNC: 38 MG/DL (ref 8–22)
CALCIUM SERPL-MCNC: 8.6 MG/DL (ref 8.5–10.5)
CHLORIDE SERPL-SCNC: 103 MMOL/L (ref 96–112)
CHOLEST SERPL-MCNC: 108 MG/DL (ref 100–199)
CO2 SERPL-SCNC: 28 MMOL/L (ref 20–33)
CREAT SERPL-MCNC: 0.76 MG/DL (ref 0.5–1.4)
ERYTHROCYTE [DISTWIDTH] IN BLOOD BY AUTOMATED COUNT: 45.2 FL (ref 35.9–50)
GLOBULIN SER CALC-MCNC: 2.7 G/DL (ref 1.9–3.5)
GLUCOSE BLD-MCNC: 109 MG/DL (ref 65–99)
GLUCOSE BLD-MCNC: 113 MG/DL (ref 65–99)
GLUCOSE BLD-MCNC: 74 MG/DL (ref 65–99)
GLUCOSE BLD-MCNC: 91 MG/DL (ref 65–99)
GLUCOSE SERPL-MCNC: 92 MG/DL (ref 65–99)
HCT VFR BLD AUTO: 44.3 % (ref 42–52)
HDLC SERPL-MCNC: 45 MG/DL
HGB BLD-MCNC: 14.5 G/DL (ref 14–18)
LDLC SERPL CALC-MCNC: 38 MG/DL
MCH RBC QN AUTO: 28.9 PG (ref 27–33)
MCHC RBC AUTO-ENTMCNC: 32.7 G/DL (ref 33.7–35.3)
MCV RBC AUTO: 88.4 FL (ref 81.4–97.8)
PLATELET # BLD AUTO: 161 K/UL (ref 164–446)
PMV BLD AUTO: 10 FL (ref 9–12.9)
POTASSIUM SERPL-SCNC: 4.1 MMOL/L (ref 3.6–5.5)
PROT SERPL-MCNC: 6.2 G/DL (ref 6–8.2)
RBC # BLD AUTO: 5.01 M/UL (ref 4.7–6.1)
SODIUM SERPL-SCNC: 141 MMOL/L (ref 135–145)
TRIGL SERPL-MCNC: 124 MG/DL (ref 0–149)
WBC # BLD AUTO: 5.9 K/UL (ref 4.8–10.8)

## 2021-07-30 PROCEDURE — 700105 HCHG RX REV CODE 258: Performed by: STUDENT IN AN ORGANIZED HEALTH CARE EDUCATION/TRAINING PROGRAM

## 2021-07-30 PROCEDURE — 80061 LIPID PANEL: CPT

## 2021-07-30 PROCEDURE — A9270 NON-COVERED ITEM OR SERVICE: HCPCS | Performed by: STUDENT IN AN ORGANIZED HEALTH CARE EDUCATION/TRAINING PROGRAM

## 2021-07-30 PROCEDURE — 85027 COMPLETE CBC AUTOMATED: CPT

## 2021-07-30 PROCEDURE — 36415 COLL VENOUS BLD VENIPUNCTURE: CPT

## 2021-07-30 PROCEDURE — 97165 OT EVAL LOW COMPLEX 30 MIN: CPT

## 2021-07-30 PROCEDURE — 80053 COMPREHEN METABOLIC PANEL: CPT

## 2021-07-30 PROCEDURE — 700102 HCHG RX REV CODE 250 W/ 637 OVERRIDE(OP): Performed by: STUDENT IN AN ORGANIZED HEALTH CARE EDUCATION/TRAINING PROGRAM

## 2021-07-30 PROCEDURE — 700111 HCHG RX REV CODE 636 W/ 250 OVERRIDE (IP): Performed by: STUDENT IN AN ORGANIZED HEALTH CARE EDUCATION/TRAINING PROGRAM

## 2021-07-30 PROCEDURE — 99232 SBSQ HOSP IP/OBS MODERATE 35: CPT | Performed by: STUDENT IN AN ORGANIZED HEALTH CARE EDUCATION/TRAINING PROGRAM

## 2021-07-30 PROCEDURE — 700101 HCHG RX REV CODE 250: Performed by: STUDENT IN AN ORGANIZED HEALTH CARE EDUCATION/TRAINING PROGRAM

## 2021-07-30 PROCEDURE — 82962 GLUCOSE BLOOD TEST: CPT | Mod: 91

## 2021-07-30 PROCEDURE — 700111 HCHG RX REV CODE 636 W/ 250 OVERRIDE (IP): Performed by: SURGERY

## 2021-07-30 PROCEDURE — 97162 PT EVAL MOD COMPLEX 30 MIN: CPT

## 2021-07-30 PROCEDURE — 770001 HCHG ROOM/CARE - MED/SURG/GYN PRIV*

## 2021-07-30 RX ADMIN — PHENOL 1 SPRAY: 1.5 LIQUID ORAL at 08:36

## 2021-07-30 RX ADMIN — METRONIDAZOLE 500 MG: 500 INJECTION, SOLUTION INTRAVENOUS at 22:05

## 2021-07-30 RX ADMIN — CEFTRIAXONE SODIUM 2 G: 10 INJECTION, POWDER, FOR SOLUTION INTRAVENOUS at 05:49

## 2021-07-30 RX ADMIN — METRONIDAZOLE 500 MG: 500 INJECTION, SOLUTION INTRAVENOUS at 13:16

## 2021-07-30 RX ADMIN — HYDROMORPHONE HYDROCHLORIDE 1 MG: 1 INJECTION, SOLUTION INTRAMUSCULAR; INTRAVENOUS; SUBCUTANEOUS at 05:48

## 2021-07-30 RX ADMIN — SODIUM CHLORIDE, POTASSIUM CHLORIDE, SODIUM LACTATE AND CALCIUM CHLORIDE: 600; 310; 30; 20 INJECTION, SOLUTION INTRAVENOUS at 22:08

## 2021-07-30 RX ADMIN — PHENOL 1 SPRAY: 1.5 LIQUID ORAL at 23:18

## 2021-07-30 RX ADMIN — DEXTROSE MONOHYDRATE 50 ML: 25 INJECTION, SOLUTION INTRAVENOUS at 17:30

## 2021-07-30 RX ADMIN — HYDROMORPHONE HYDROCHLORIDE 1 MG: 1 INJECTION, SOLUTION INTRAMUSCULAR; INTRAVENOUS; SUBCUTANEOUS at 22:16

## 2021-07-30 RX ADMIN — PHENOL 1 SPRAY: 1.5 LIQUID ORAL at 14:37

## 2021-07-30 RX ADMIN — METRONIDAZOLE 500 MG: 500 INJECTION, SOLUTION INTRAVENOUS at 05:49

## 2021-07-30 RX ADMIN — SODIUM CHLORIDE, POTASSIUM CHLORIDE, SODIUM LACTATE AND CALCIUM CHLORIDE: 600; 310; 30; 20 INJECTION, SOLUTION INTRAVENOUS at 11:48

## 2021-07-30 ASSESSMENT — ENCOUNTER SYMPTOMS
MYALGIAS: 0
SHORTNESS OF BREATH: 0
DIZZINESS: 0
FEVER: 0
FALLS: 0
COUGH: 0
HEADACHES: 0
PALPITATIONS: 0
CHILLS: 0
DIARRHEA: 0
BRUISES/BLEEDS EASILY: 0
CONSTIPATION: 1
BLURRED VISION: 0
NAUSEA: 1
FLANK PAIN: 0
HEARTBURN: 1
FOCAL WEAKNESS: 0
VOMITING: 1
DOUBLE VISION: 0
SPEECH CHANGE: 0
ABDOMINAL PAIN: 1
DEPRESSION: 0

## 2021-07-30 ASSESSMENT — GAIT ASSESSMENTS
ASSISTIVE DEVICE: OTHER (COMMENTS)
GAIT LEVEL OF ASSIST: SUPERVISED
DEVIATION: BRADYKINETIC
DISTANCE (FEET): 200

## 2021-07-30 ASSESSMENT — PAIN DESCRIPTION - PAIN TYPE
TYPE: SURGICAL PAIN
TYPE: SURGICAL PAIN
TYPE: SURGICAL PAIN;ACUTE PAIN

## 2021-07-30 ASSESSMENT — COGNITIVE AND FUNCTIONAL STATUS - GENERAL
SUGGESTED CMS G CODE MODIFIER DAILY ACTIVITY: CI
MOVING TO AND FROM BED TO CHAIR: A LITTLE
CLIMB 3 TO 5 STEPS WITH RAILING: A LITTLE
HELP NEEDED FOR BATHING: A LITTLE
MOBILITY SCORE: 20
MOVING FROM LYING ON BACK TO SITTING ON SIDE OF FLAT BED: A LITTLE
TURNING FROM BACK TO SIDE WHILE IN FLAT BAD: A LITTLE
SUGGESTED CMS G CODE MODIFIER MOBILITY: CJ
DAILY ACTIVITIY SCORE: 23

## 2021-07-30 ASSESSMENT — ACTIVITIES OF DAILY LIVING (ADL): TOILETING: INDEPENDENT

## 2021-07-30 NOTE — PROGRESS NOTES
Hospital Medicine Daily Progress Note    Date of Service  7/30/2021    Chief Complaint  Reynaldo Dodge is a 76 y.o. male admitted 7/29/2021 with nausea, vomiting, abdominal discomfort for 48 hours.    Hospital Course  Mr. Reynaldo Dodge is a 76-year-old male who had ERCP and lap cholecystectomy a 2 months prior to [facility], who presented to outstanding ER with complains of nausea vomit abdominal pain 40 hours.  He was found to have a distal small bowel obstruction, subsequently transferred to [facility] for surgical evaluation as [facility] had no bed available.    Interval Problem Update  7/29 I consulted Dr. Landin.  Patient is taken to the OR for surgical intervention today.  Continue empiric antibiotic coverage and IV fluid.  Postop pain control as per surgery.  Await surgical findings.    7/30: POD 1 - doing well, AAO x4, NG putting out bilious output. Pain controlled. No leukocytosis. Currently on 2L oxygen. Postop pain control and diet as per surgery.    I have personally seen and examined the patient at bedside. I discussed the plan of care with patient and family.    Consultants/Specialty  Surgery (Dr. Landin)    Code Status  Full Code    Disposition  Patient is not medically cleared.   Anticipate discharge to to home with close outpatient follow-up.  I have placed the appropriate orders for post-discharge needs.    Review of Systems  Review of Systems   Constitutional: Positive for malaise/fatigue. Negative for chills and fever.   HENT: Negative for hearing loss and tinnitus.    Eyes: Negative for blurred vision and double vision.   Respiratory: Negative for cough and shortness of breath.    Cardiovascular: Negative for chest pain, palpitations and leg swelling.   Gastrointestinal: Positive for abdominal pain, constipation, heartburn, nausea and vomiting. Negative for diarrhea.   Genitourinary: Negative for dysuria and flank pain.   Musculoskeletal: Negative for falls and  myalgias.   Skin: Negative for itching and rash.   Neurological: Negative for dizziness, speech change, focal weakness and headaches.   Endo/Heme/Allergies: Negative for environmental allergies. Does not bruise/bleed easily.   Psychiatric/Behavioral: Negative for depression and suicidal ideas.   All other systems reviewed and are negative.       Physical Exam  Temp:  [36.2 °C (97.1 °F)-36.6 °C (97.9 °F)] 36.2 °C (97.1 °F)  Pulse:  [60-97] 60  Resp:  [10-18] 18  BP: (114-162)/(68-89) 121/71  SpO2:  [93 %-98 %] 94 %    Physical Exam  Constitutional:       Appearance: He is ill-appearing.   HENT:      Head: Normocephalic and atraumatic.      Nose: Nose normal.      Comments: NG in place     Mouth/Throat:      Mouth: Mucous membranes are dry.      Pharynx: Oropharynx is clear.   Eyes:      General: No scleral icterus.     Extraocular Movements: Extraocular movements intact.   Cardiovascular:      Rate and Rhythm: Normal rate and regular rhythm.      Pulses: Normal pulses.      Heart sounds: No friction rub.   Pulmonary:      Comments: Decreased bibasilar breath sounds, mild inspiratory Rales  Abdominal:      Comments: Distended, generalized tenderness  Abd wound dressing noted, no oozing of blood or pus seen  No guarding, no rebound, BS present   Musculoskeletal:         General: Tenderness present. No swelling. Normal range of motion.      Cervical back: Neck supple. No tenderness.   Skin:     General: Skin is warm and dry.      Capillary Refill: Capillary refill takes 2 to 3 seconds.   Neurological:      General: No focal deficit present.      Mental Status: He is alert and oriented to person, place, and time.      Motor: No weakness.   Psychiatric:         Mood and Affect: Mood normal.         Fluids    Intake/Output Summary (Last 24 hours) at 7/30/2021 1126  Last data filed at 7/30/2021 1100  Gross per 24 hour   Intake 200 ml   Output 1650 ml   Net -1450 ml       Laboratory  Recent Labs     07/29/21  0817  07/30/21  0553   WBC 9.0 5.9   RBC 5.91 5.01   HEMOGLOBIN 17.3 14.5   HEMATOCRIT 52.3* 44.3   MCV 88.5 88.4   MCH 29.3 28.9   MCHC 33.1* 32.7*   RDW 46.4 45.2   PLATELETCT 151* 161*   MPV 9.8 10.0     Recent Labs     07/29/21  0859 07/30/21  0553   SODIUM 139 141   POTASSIUM 4.2 4.1   CHLORIDE 105 103   CO2 25 28   GLUCOSE 139* 92   BUN 41* 38*   CREATININE 0.88 0.76   CALCIUM 8.9 8.6     Recent Labs     07/29/21  0508   INR 1.06         Recent Labs     07/30/21  0553   TRIGLYCERIDE 124   HDL 45   LDL 38       Imaging  OUTSIDE IMAGES-DX CHEST   Final Result      OUTSIDE IMAGES-CT ABDOMEN /PELVIS   Final Result      DX-ABDOMEN FOR TUBE PLACEMENT   Final Result      1.  Nasogastric tube tip terminates in the stomach region with the side port near the GE junction. Recommend mild enhancement.   2.  Dilated small bowel.           Assessment/Plan  * Small bowel obstruction (HCC)- (present on admission)  Assessment & Plan  Recent ERCP and lap cholecystectomy at [facility] 2 months prior to admission    CTAP notable for distal SBO  Bowel rest  NG LIS  IVF  Pain control    Surgery f/u appreciated  S/p exploratory laparotomy with lysis of adhesions and reduction of internal hernia 7/29/2021 by Dr. Landin  POD 1 - 7/30: pain controlled, persistent NG bilious output, no leukocytosis, IVF    Acute respiratory failure with hypoxia (HCC)  Assessment & Plan  Post op - 10-15L  On 2L 7/30 -- wean off  Elevated procal -- already on empiric abx    Insomnia- (present on admission)  Assessment & Plan  PRN Ambien    Chronic back pain- (present on admission)  Assessment & Plan  Gabapentin, other PRN pain meds ordered    Moderate protein-calorie malnutrition (HCC)  Assessment & Plan  Resume diet when cleared by surgery    Dehydration  Assessment & Plan  IVF    Leukocytosis  Assessment & Plan  Resolved  18k at another facility    Sepsis (HCC)- (present on admission)  Assessment & Plan  RESOVLED    IVF, abx  Likely not true sepsis, but  secondary to SBO, antibiotic for now, may de-escalate pending clinical course         VTE prophylaxis: SCDs/TEDs    I have performed a physical exam and reviewed and updated ROS and Plan today (7/30/2021). In review of yesterday's note (7/29/2021), there are no changes except as documented above.

## 2021-07-30 NOTE — THERAPY
Occupational Therapy   Initial Evaluation     Patient Name: Reynaldo Dodge  Age:  76 y.o., Sex:  male  Medical Record #: [id number]  Today's Date: 7/30/2021     Precautions: (P)  (abdominal precautions)    Assessment  Patient is 76 y.o. male seen POD #1 s/p ex lap with reduction of internal hernia and   correction of volvulus with enterolysis. Pt presented today at supervision level for UB dressing, LB dressing, functional ADL txfs without AD. Spouse reported she will be available to assist 24/7 upon DC. Answered all questions regarding adaptive equipment. Provided education on compensatory techniques for ADLs at home for less strain on abdominal wall. Pt and spouse agreeable and receptive to education. I anticipate pt is safe to DC home with wife assist once medically cleared. Recommend ambulation to the bathroom with nursing while in the hospital. No further acute OT needs at this time.    Plan    Recommend Occupational Therapy for Evaluation only     DC Equipment Recommendations: (P) Tub / Shower Seat (reacher)  Discharge Recommendations: (P) Recommend home health for continued occupational therapy services        07/30/21 1047   Initial Contact Note    Initial Contact Note Order Received and Verified, Evaluation Only - Patient Does Not Require Further Acute Occupational Therapy at this Time.  However, May Benefit from Post Acute Therapy for Higher Level Functional Deficits.   Prior Living Situation   Prior Services Home-Independent   Housing / Facility 1 Story House   Steps Into Home 1   Steps In Home 0   Bathroom Set up Bathtub / Shower Combination   Equipment Owned None   Lives with - Patient's Self Care Capacity Spouse   Comments Pt lives with spouse who can assist during s/p recovery. Spouse reports she plans to go to [employer] today to buy needed AD   Prior Level of ADL Function   Self Feeding Independent   Grooming / Hygiene Independent   Bathing Independent   Dressing Independent   Toileting Independent    Prior Level of IADL Function   Medication Management Independent   Laundry Independent   Kitchen Mobility Independent   Finances Independent   Home Management Independent   Shopping Independent   Prior Level Of Mobility Independent Without Device in Community;Independent Without Device in Home   Driving / Transportation Driving Independent   Comments Pt reports active and independent at baseline   Precautions   Precautions   (abdominal precautions)   Pain   Pain Scales 0 to 10 Scale    Intervention Declines   Cognition    Cognition / Consciousness WDL   Comments very pleasant and agreeable; [place]; potentially some memory concerns at baseline (turned to wife to answer most home questions)   Active ROM Upper Body   Active ROM Upper Body  WDL   Strength Upper Body   Upper Body Strength  WDL   Balance Assessment   Sitting Balance (Static) Fair +   Sitting Balance (Dynamic) Fair +   Standing Balance (Static) Fair   Standing Balance (Dynamic) Fair   Weight Shift Sitting Fair   Weight Shift Standing Fair   Comments without AD   Bed Mobility    Supine to Sit Supervised   Sit to Supine Supervised   Scooting Supervised   Rolling Supervised   Comments good teach back of log roll techniques   ADL Assessment   Grooming Supervision;Standing   Upper Body Dressing Supervision   Lower Body Dressing Supervision   Comments reports no concerns for ADLs at home; receptive to ADL modifcations for less strain on abdominal wall   How much help from another person does the patient currently need...   Putting on and taking off regular lower body clothing? 4   Bathing (including washing, rinsing, and drying)? 3   Toileting, which includes using a toilet, bedpan, or urinal? 4   Putting on and taking off regular upper body clothing? 4   Taking care of personal grooming such as brushing teeth? 4   Eating meals? 4   6 Clicks Daily Activity Score 23   Functional Mobility   Sit to Stand Supervised   Transfer Method Stand Step   Mobility room  distances without aD   Activity Tolerance   Sitting Edge of Bed 15 min   Standing 5 min   Patient / Family Goals   Patient / Family Goal #1 return home   Education Group   Education Provided Transfers;Activities of Daily Living;Adaptive Equipment   Role of Occupational Therapist Patient Response Patient;Acceptance;Explanation   Transfers Patient Response Patient;Significant Other;Acceptance;Explanation;Action Demonstration   ADL Patient Response Patient;Significant Other;Acceptance;Demonstration;Explanation;Action Demonstration   Adaptive Equipment Patient Response Patient;Significant Other;Acceptance;Explanation   Anticipated Discharge Equipment and Recommendations   DC Equipment Recommendations Tub / Shower Seat  (reacher)   Discharge Recommendations Recommend home health for continued occupational therapy services   Interdisciplinary Plan of Care Collaboration   IDT Collaboration with  Nursing   Patient Position at End of Therapy In Bed;Phone within Reach;Tray Table within Reach;Call Light within Reach;Bed Alarm On;Family / Friend in Room   Collaboration Comments OT findings and recs   Session Information   Date / Session Number  7/30 OT eval only, SM   Priority 0

## 2021-07-30 NOTE — THERAPY
Physical Therapy   Initial Evaluation     Patient Name: Reynaldo Dodge  Age:  76 y.o., Sex:  male  Medical Record #: [id number]  Today's Date: 7/30/2021     Precautions:  (abdominal precautions)    Assessment  Patient is 76 y.o. male presenting POD1 ex lap w/ lysis of adhesions and reduction of internal hernia s/p SBO.  He lives with his wife (present throughout eval) in a SS home w/ no TOMÁS.  The pt was previously independent w/ all functional mobility and ambulatory tasks, only using a SPC when his LBP flares up, otherwise using no AD.  Currently, he demos bed mobility Daphne w/ HOB flat and cues to log roll, and STS Daphne w/ no AD.  The pt ambulated slowly about 200' in a moving environment w/ IV pole for RUE support spv and no LOB observed.  Recommend pt dc home w/ HH for further home safety evaluation and PT needs given current level of independence w/ functional mobility tasks.  Will not follow, please re consult should there be a change in the pt's condition.    Plan    Recommend Physical Therapy for Evaluation only    DC Equipment Recommendations: None  Discharge Recommendations: Recommend home health for continued physical therapy services       Subjective/Objective       07/30/21 0915   Prior Living Situation   Prior Services Home-Independent   Housing / Facility 1 Story House   Steps Into Home 0   Equipment Owned Single Point Cane   Lives with - Patient's Self Care Capacity Spouse   Comments Pt's spouse is able to assist as needed when he returns home   Prior Level of Functional Mobility   Bed Mobility Independent   Transfer Status Independent   Ambulation Independent   Distance Ambulation (Feet)   (community distances)   Assistive Devices Used Single Point Cane  (pt used no AD mostly, but SPC when back hurts)   Stairs Independent   History of Falls   History of Falls No   Cognition    Cognition / Consciousness WDL   Comments Pt pleasant and cooperative, but very [place]   Active ROM Lower Body    Active ROM Lower  Body  WDL   Comments observed during functional mobility   Strength Lower Body   Lower Body Strength  WDL   Comments as above   Sensation Lower Body   Lower Extremity Sensation   WDL   Comments pt reports no numbness/tingling in LE's   Coordination Lower Body    Coordination Lower Body  WDL   Balance Assessment   Sitting Balance (Static) Good   Sitting Balance (Dynamic) Good   Standing Balance (Static) Fair   Standing Balance (Dynamic) Fair   Weight Shift Sitting Good   Weight Shift Standing Fair   Comments Stand w/ no AD   Gait Analysis   Gait Level Of Assist Supervised   Assistive Device Other (Comments)  (IV pole)   Distance (Feet) 200   Deviation Bradykinetic   Weight Bearing Status FWB BLE   Comments no LOB noted   Bed Mobility    Supine to Sit Modified Independent   Sit to Supine Modified Independent   Scooting Modified Independent   Rolling Modified Independent   Comments log roll, HOB flat   Functional Mobility   Sit to Stand Modified Independent   Bed, Chair, Wheelchair Transfer Modified Independent   Transfer Method Stand Step   Mobility STS EOB no AD   Activity Tolerance   Sitting Edge of Bed 15min   Standing 10min   Edema / Skin Assessment   Edema / Skin  WDL   Education Group   Education Provided Role of Physical Therapist;Exercises - Supine   Role of Physical Therapist Patient Response Patient;Significant Other;Acceptance;Explanation;Demonstration;Verbal Demonstration;Action Demonstration   Exercises - Supine Patient Response Patient;Significant Other;Acceptance;Explanation;Demonstration;Verbal Demonstration;Action Demonstration  (ankle pumps)   Additional Comments Pt and wife receptive of edu provided   Problem List    Problems Pain   Anticipated Discharge Equipment and Recommendations   DC Equipment Recommendations None   Discharge Recommendations Recommend home health for continued physical therapy services   Interdisciplinary Plan of Care Collaboration   IDT Collaboration with  Nursing   Patient  Position at End of Therapy In Bed;Bed Alarm On;Call Light within Reach;Tray Table within Reach;Phone within Reach   Collaboration Comments regarding outcome of tx session   Session Information   Date / Session Number  7/30- eval only

## 2021-07-30 NOTE — PROGRESS NOTES
POD1 exlap,ALEX for internal hernia, no bowel resection  Feels well, distended, pain well controlled, +nausea  Abdomen soft, TTP as appropriate    Plan:  Appreciate medical care  PRN analgesia  Cont NGT, NPO w/ ice  dvt prophylaxis

## 2021-07-30 NOTE — CARE PLAN
The patient is Watcher - Medium risk of patient condition declining or worsening    Shift Goals  Clinical Goals: bowel movement  Patient Goals: bowel movement, pain control    Progress made toward(s) clinical / shift goals:  pt will mobilize today, pt currently has bowel sounds. Pain is being well managed    Problem: Knowledge Deficit - Standard  Goal: Patient and family/care givers will demonstrate understanding of plan of care, disease process/condition, diagnostic tests and medications  Outcome: Progressing     Problem: Pain - Standard  Goal: Alleviation of pain or a reduction in pain to the patient’s comfort goal  Outcome: Progressing         Patient is not progressing towards the following goals:

## 2021-07-31 ENCOUNTER — APPOINTMENT (OUTPATIENT)
Dept: RADIOLOGY | Facility: MEDICAL CENTER | Age: 76
DRG: 335 | End: 2021-07-31
Attending: STUDENT IN AN ORGANIZED HEALTH CARE EDUCATION/TRAINING PROGRAM
Payer: MEDICARE

## 2021-07-31 PROBLEM — E16.2 HYPOGLYCEMIA: Status: ACTIVE | Noted: 2021-07-31

## 2021-07-31 LAB
ALBUMIN SERPL BCP-MCNC: 3.4 G/DL (ref 3.2–4.9)
ALBUMIN/GLOB SERPL: 1.4 G/DL
ALP SERPL-CCNC: 61 U/L (ref 30–99)
ALT SERPL-CCNC: 14 U/L (ref 2–50)
ANION GAP SERPL CALC-SCNC: 12 MMOL/L (ref 7–16)
AST SERPL-CCNC: 20 U/L (ref 12–45)
BASOPHILS # BLD AUTO: 0.2 % (ref 0–1.8)
BASOPHILS # BLD: 0.01 K/UL (ref 0–0.12)
BILIRUB SERPL-MCNC: 0.8 MG/DL (ref 0.1–1.5)
BUN SERPL-MCNC: 29 MG/DL (ref 8–22)
CALCIUM SERPL-MCNC: 8.3 MG/DL (ref 8.5–10.5)
CHLORIDE SERPL-SCNC: 106 MMOL/L (ref 96–112)
CO2 SERPL-SCNC: 23 MMOL/L (ref 20–33)
CREAT SERPL-MCNC: 0.7 MG/DL (ref 0.5–1.4)
EOSINOPHIL # BLD AUTO: 0.2 K/UL (ref 0–0.51)
EOSINOPHIL NFR BLD: 3.8 % (ref 0–6.9)
ERYTHROCYTE [DISTWIDTH] IN BLOOD BY AUTOMATED COUNT: 41.7 FL (ref 35.9–50)
GLOBULIN SER CALC-MCNC: 2.4 G/DL (ref 1.9–3.5)
GLUCOSE BLD-MCNC: 70 MG/DL (ref 65–99)
GLUCOSE BLD-MCNC: 74 MG/DL (ref 65–99)
GLUCOSE BLD-MCNC: 82 MG/DL (ref 65–99)
GLUCOSE SERPL-MCNC: 77 MG/DL (ref 65–99)
HCT VFR BLD AUTO: 42.2 % (ref 42–52)
HGB BLD-MCNC: 14.2 G/DL (ref 14–18)
IMM GRANULOCYTES # BLD AUTO: 0.01 K/UL (ref 0–0.11)
IMM GRANULOCYTES NFR BLD AUTO: 0.2 % (ref 0–0.9)
LYMPHOCYTES # BLD AUTO: 0.84 K/UL (ref 1–4.8)
LYMPHOCYTES NFR BLD: 15.8 % (ref 22–41)
MAGNESIUM SERPL-MCNC: 1.9 MG/DL (ref 1.5–2.5)
MCH RBC QN AUTO: 28.7 PG (ref 27–33)
MCHC RBC AUTO-ENTMCNC: 33.6 G/DL (ref 33.7–35.3)
MCV RBC AUTO: 85.4 FL (ref 81.4–97.8)
MONOCYTES # BLD AUTO: 0.45 K/UL (ref 0–0.85)
MONOCYTES NFR BLD AUTO: 8.5 % (ref 0–13.4)
NEUTROPHILS # BLD AUTO: 3.8 K/UL (ref 1.82–7.42)
NEUTROPHILS NFR BLD: 71.5 % (ref 44–72)
NRBC # BLD AUTO: 0 K/UL
NRBC BLD-RTO: 0 /100 WBC
PHOSPHATE SERPL-MCNC: 1.3 MG/DL (ref 2.5–4.5)
PLATELET # BLD AUTO: 143 K/UL (ref 164–446)
PMV BLD AUTO: 10.2 FL (ref 9–12.9)
POTASSIUM SERPL-SCNC: 3.7 MMOL/L (ref 3.6–5.5)
PREALB SERPL-MCNC: 15.8 MG/DL (ref 18–38)
PROT SERPL-MCNC: 5.8 G/DL (ref 6–8.2)
RBC # BLD AUTO: 4.94 M/UL (ref 4.7–6.1)
SODIUM SERPL-SCNC: 141 MMOL/L (ref 135–145)
WBC # BLD AUTO: 5.3 K/UL (ref 4.8–10.8)

## 2021-07-31 PROCEDURE — 99232 SBSQ HOSP IP/OBS MODERATE 35: CPT | Performed by: STUDENT IN AN ORGANIZED HEALTH CARE EDUCATION/TRAINING PROGRAM

## 2021-07-31 PROCEDURE — 700102 HCHG RX REV CODE 250 W/ 637 OVERRIDE(OP): Performed by: STUDENT IN AN ORGANIZED HEALTH CARE EDUCATION/TRAINING PROGRAM

## 2021-07-31 PROCEDURE — 83735 ASSAY OF MAGNESIUM: CPT

## 2021-07-31 PROCEDURE — 770001 HCHG ROOM/CARE - MED/SURG/GYN PRIV*

## 2021-07-31 PROCEDURE — 84134 ASSAY OF PREALBUMIN: CPT

## 2021-07-31 PROCEDURE — 700101 HCHG RX REV CODE 250: Performed by: STUDENT IN AN ORGANIZED HEALTH CARE EDUCATION/TRAINING PROGRAM

## 2021-07-31 PROCEDURE — 700111 HCHG RX REV CODE 636 W/ 250 OVERRIDE (IP): Performed by: SURGERY

## 2021-07-31 PROCEDURE — 700111 HCHG RX REV CODE 636 W/ 250 OVERRIDE (IP): Performed by: STUDENT IN AN ORGANIZED HEALTH CARE EDUCATION/TRAINING PROGRAM

## 2021-07-31 PROCEDURE — 700105 HCHG RX REV CODE 258: Performed by: STUDENT IN AN ORGANIZED HEALTH CARE EDUCATION/TRAINING PROGRAM

## 2021-07-31 PROCEDURE — 80053 COMPREHEN METABOLIC PANEL: CPT

## 2021-07-31 PROCEDURE — A9270 NON-COVERED ITEM OR SERVICE: HCPCS | Performed by: STUDENT IN AN ORGANIZED HEALTH CARE EDUCATION/TRAINING PROGRAM

## 2021-07-31 PROCEDURE — 84100 ASSAY OF PHOSPHORUS: CPT

## 2021-07-31 PROCEDURE — 36415 COLL VENOUS BLD VENIPUNCTURE: CPT

## 2021-07-31 PROCEDURE — 85025 COMPLETE CBC W/AUTO DIFF WBC: CPT

## 2021-07-31 PROCEDURE — 82962 GLUCOSE BLOOD TEST: CPT | Mod: 91

## 2021-07-31 RX ORDER — DEXTROSE, SODIUM CHLORIDE, SODIUM LACTATE, POTASSIUM CHLORIDE, AND CALCIUM CHLORIDE 5; .6; .31; .03; .02 G/100ML; G/100ML; G/100ML; G/100ML; G/100ML
INJECTION, SOLUTION INTRAVENOUS CONTINUOUS
Status: DISCONTINUED | OUTPATIENT
Start: 2021-07-31 | End: 2021-08-02 | Stop reason: HOSPADM

## 2021-07-31 RX ORDER — HEPARIN SODIUM 5000 [USP'U]/ML
5000 INJECTION, SOLUTION INTRAVENOUS; SUBCUTANEOUS EVERY 8 HOURS
Status: DISCONTINUED | OUTPATIENT
Start: 2021-07-31 | End: 2021-08-02 | Stop reason: HOSPADM

## 2021-07-31 RX ADMIN — HYDROMORPHONE HYDROCHLORIDE 1 MG: 1 INJECTION, SOLUTION INTRAMUSCULAR; INTRAVENOUS; SUBCUTANEOUS at 04:59

## 2021-07-31 RX ADMIN — HEPARIN SODIUM 5000 UNITS: 5000 INJECTION, SOLUTION INTRAVENOUS; SUBCUTANEOUS at 23:15

## 2021-07-31 RX ADMIN — PHENOL 1 SPRAY: 1.5 LIQUID ORAL at 11:26

## 2021-07-31 RX ADMIN — FAMOTIDINE 20 MG: 10 INJECTION INTRAVENOUS at 23:15

## 2021-07-31 RX ADMIN — METRONIDAZOLE 500 MG: 500 INJECTION, SOLUTION INTRAVENOUS at 23:15

## 2021-07-31 RX ADMIN — METRONIDAZOLE 500 MG: 500 INJECTION, SOLUTION INTRAVENOUS at 05:24

## 2021-07-31 RX ADMIN — DOCUSATE SODIUM 50 MG AND SENNOSIDES 8.6 MG 2 TABLET: 8.6; 5 TABLET, FILM COATED ORAL at 17:22

## 2021-07-31 RX ADMIN — PHENOL 1 SPRAY: 1.5 LIQUID ORAL at 14:19

## 2021-07-31 RX ADMIN — CEFTRIAXONE SODIUM 2 G: 10 INJECTION, POWDER, FOR SOLUTION INTRAVENOUS at 05:05

## 2021-07-31 RX ADMIN — DEXTROSE MONOHYDRATE 50 ML: 25 INJECTION, SOLUTION INTRAVENOUS at 05:16

## 2021-07-31 RX ADMIN — PHENOL 1 SPRAY: 1.5 LIQUID ORAL at 05:11

## 2021-07-31 RX ADMIN — METRONIDAZOLE 500 MG: 500 INJECTION, SOLUTION INTRAVENOUS at 14:19

## 2021-07-31 RX ADMIN — HEPARIN SODIUM 5000 UNITS: 5000 INJECTION, SOLUTION INTRAVENOUS; SUBCUTANEOUS at 14:13

## 2021-07-31 RX ADMIN — SODIUM CHLORIDE, SODIUM LACTATE, POTASSIUM CHLORIDE, CALCIUM CHLORIDE AND DEXTROSE MONOHYDRATE: 5; 600; 310; 30; 20 INJECTION, SOLUTION INTRAVENOUS at 08:58

## 2021-07-31 RX ADMIN — HYDROMORPHONE HYDROCHLORIDE 1 MG: 1 INJECTION, SOLUTION INTRAMUSCULAR; INTRAVENOUS; SUBCUTANEOUS at 11:26

## 2021-07-31 ASSESSMENT — ENCOUNTER SYMPTOMS
FEVER: 0
DIARRHEA: 0
HEARTBURN: 1
DEPRESSION: 0
FLANK PAIN: 0
COUGH: 0
VOMITING: 1
FOCAL WEAKNESS: 0
PALPITATIONS: 0
CONSTIPATION: 1
MYALGIAS: 0
BRUISES/BLEEDS EASILY: 0
SPEECH CHANGE: 0
ABDOMINAL PAIN: 1
HEADACHES: 0
DOUBLE VISION: 0
CHILLS: 0
BLURRED VISION: 0
DIZZINESS: 0
FALLS: 0
NAUSEA: 1
SHORTNESS OF BREATH: 0

## 2021-07-31 ASSESSMENT — PAIN DESCRIPTION - PAIN TYPE
TYPE: ACUTE PAIN
TYPE: SURGICAL PAIN;ACUTE PAIN
TYPE: ACUTE PAIN

## 2021-07-31 NOTE — CARE PLAN
Problem: Knowledge Deficit - Standard  Goal: Patient and family/care givers will demonstrate understanding of plan of care, disease process/condition, diagnostic tests and medications  Outcome: Progressing     Problem: Pain - Standard  Goal: Alleviation of pain or a reduction in pain to the patient’s comfort goal  Outcome: Progressing     Problem: Hemodynamics  Goal: Patient's hemodynamics, fluid balance and neurologic status will be stable or improve  Outcome: Progressing     Problem: Respiratory  Goal: Patient will achieve/maintain optimum respiratory ventilation and gas exchange  Outcome: Progressing     The patient is Stable - Low risk of patient condition declining or worsening    Shift Goals  Clinical Goals: BM  Patient Goals: ambulation    Progress made toward(s) clinical / shift goals: Pt ambulating more    Patient is not progressing towards the following goals: No BM/flatus yet

## 2021-07-31 NOTE — PROGRESS NOTES
POD2 ex-lap, ALEX  Feels better today, still with some belching and hiccups, no nausea  Only 400cc from NGT  2 small flatus, no BM  Abdomen soft, incision c/d/i  Labs and vitals unremarkable    Plan:  Appreciate medical care  dvt prophylaxis  OOB  Clamp NGT, ok for clears today  PRN analgesia  Will follow with you

## 2021-07-31 NOTE — PROGRESS NOTES
"Assumed care of patient from night shift Rn.  Patient is alert and oriented times 4, states pain of 5/10, declines intervention at this time.  VSS /77   Pulse 66   Temp 36.6 °C (97.9 °F) (Temporal)   Resp 18   Ht 1.753 m (5' 9\")   Wt 78.3 kg (172 lb 9.9 oz)   SpO2 97%   BMI 25.49 kg/m²   PIV in the LFA, patent and running D5 LR at 75mL/hr.  On 2L oxygen with saturations in the mid 90s.  2L is baseline oxygen at home.  Last BM PTA, + flatus per patient.  Urinating without difficulty.  NG tube to the L nare, to low continuous suction.  Bilious output noted.  NPO with ice chips at this time.  MLI with island dressing, small old drainage noted.  Patient is SBA with a FWW, demonstrates steady gait, minimal assistance needed.  POC discussed for the day, bed is locked and in the lowest position, call light is within reach.  All needs are met at this time, hourly rounding is in place.   "

## 2021-07-31 NOTE — PROGRESS NOTES
Hospital Medicine Daily Progress Note    Date of Service  7/31/2021    Chief Complaint  Reynaldo Dodge is a 76 y.o. male admitted 7/29/2021 with nausea, vomiting, abdominal discomfort for 48 hours.    Hospital Course  Mr. Reynaldo Dodge is a 76-year-old male who had ERCP and lap cholecystectomy a 2 months prior to [facility], who presented to outstanding ER with complains of nausea vomit abdominal pain 40 hours.  He was found to have a distal small bowel obstruction, subsequently transferred to [facility] for surgical evaluation as [facility] had no bed available.    Interval Problem Update  7/29 I consulted Dr. Landin.  Patient is taken to the OR for surgical intervention today.  Continue empiric antibiotic coverage and IV fluid.  Postop pain control as per surgery.  Await surgical findings.    7/30: POD 1 - doing well, AAO x4, NG putting out bilious output. Pain controlled. No leukocytosis. Currently on 2L oxygen. Postop pain control and diet as per surgery.    7/31: POD 2 - doing well, ~400cc bilious output by NG. Pain is adequately controlled. Advance diet when cleared by surgery. Persistent hypoglycemia, switch LR IVF to D5W-LR infusion. Continue supportive care.    I have personally seen and examined the patient at bedside. I discussed the plan of care with patient and family.    Consultants/Specialty  Surgery (Dr. Landin)    Code Status  Full Code    Disposition  Patient is not medically cleared.   Anticipate discharge to to home with close outpatient follow-up.  I have placed the appropriate orders for post-discharge needs.    Review of Systems  Review of Systems   Constitutional: Positive for malaise/fatigue. Negative for chills and fever.   HENT: Negative for hearing loss and tinnitus.    Eyes: Negative for blurred vision and double vision.   Respiratory: Negative for cough and shortness of breath.    Cardiovascular: Negative for chest pain, palpitations and leg swelling.    Gastrointestinal: Positive for abdominal pain, constipation, heartburn, nausea and vomiting. Negative for diarrhea.   Genitourinary: Negative for dysuria and flank pain.   Musculoskeletal: Negative for falls and myalgias.   Skin: Negative for itching and rash.   Neurological: Negative for dizziness, speech change, focal weakness and headaches.   Endo/Heme/Allergies: Negative for environmental allergies. Does not bruise/bleed easily.   Psychiatric/Behavioral: Negative for depression and suicidal ideas.   All other systems reviewed and are negative.       Physical Exam  Temp:  [36.3 °C (97.4 °F)-36.9 °C (98.5 °F)] 36.6 °C (97.9 °F)  Pulse:  [66-68] 66  Resp:  [18-20] 18  BP: (143-157)/(76-81) 148/77  SpO2:  [94 %-97 %] 97 %    Physical Exam  Constitutional:       Appearance: He is ill-appearing.   HENT:      Head: Normocephalic and atraumatic.      Nose: Nose normal.      Comments: NG in place     Mouth/Throat:      Mouth: Mucous membranes are dry.      Pharynx: Oropharynx is clear.   Eyes:      General: No scleral icterus.     Extraocular Movements: Extraocular movements intact.   Cardiovascular:      Rate and Rhythm: Normal rate and regular rhythm.      Pulses: Normal pulses.      Heart sounds: No friction rub.   Pulmonary:      Comments: Decreased bibasilar breath sounds, mild inspiratory Rales  Abdominal:      Comments: Distended, generalized tenderness  Abd wound dressing noted, no oozing of blood or pus seen  No guarding, no rebound, BS present   Musculoskeletal:         General: Tenderness present. No swelling. Normal range of motion.      Cervical back: Neck supple. No tenderness.   Skin:     General: Skin is warm and dry.      Capillary Refill: Capillary refill takes 2 to 3 seconds.   Neurological:      General: No focal deficit present.      Mental Status: He is alert and oriented to person, place, and time.      Motor: No weakness.   Psychiatric:         Mood and Affect: Mood normal.          Fluids    Intake/Output Summary (Last 24 hours) at 7/31/2021 0934  Last data filed at 7/31/2021 0339  Gross per 24 hour   Intake --   Output 900 ml   Net -900 ml       Laboratory  Recent Labs     07/29/21  0859 07/30/21  0553 07/31/21  0510   WBC 9.0 5.9 5.3   RBC 5.91 5.01 4.94   HEMOGLOBIN 17.3 14.5 14.2   HEMATOCRIT 52.3* 44.3 42.2   MCV 88.5 88.4 85.4   MCH 29.3 28.9 28.7   MCHC 33.1* 32.7* 33.6*   RDW 46.4 45.2 41.7   PLATELETCT 151* 161* 143*   MPV 9.8 10.0 10.2     Recent Labs     07/29/21  0859 07/30/21  0553 07/31/21  0510   SODIUM 139 141 141   POTASSIUM 4.2 4.1 3.7   CHLORIDE 105 103 106   CO2 25 28 23   GLUCOSE 139* 92 77   BUN 41* 38* 29*   CREATININE 0.88 0.76 0.70   CALCIUM 8.9 8.6 8.3*     Recent Labs     07/29/21  0508   INR 1.06         Recent Labs     07/30/21  0553   TRIGLYCERIDE 124   HDL 45   LDL 38       Imaging  OUTSIDE IMAGES-DX CHEST   Final Result      OUTSIDE IMAGES-CT ABDOMEN /PELVIS   Final Result      DX-ABDOMEN FOR TUBE PLACEMENT   Final Result      1.  Nasogastric tube tip terminates in the stomach region with the side port near the GE junction. Recommend mild enhancement.   2.  Dilated small bowel.           Assessment/Plan  * Small bowel obstruction (HCC)- (present on admission)  Assessment & Plan  Recent ERCP and lap cholecystectomy at [facility] 2 months prior to admission    CTAP notable for distal SBO  Bowel rest  NG LIS  IVF  Pain control    Surgery f/u appreciated  S/p exploratory laparotomy with lysis of adhesions and reduction of internal hernia 7/29/2021 by Dr. Landin  POD 1 - 7/30: pain controlled, persistent NG bilious output, no leukocytosis, IVF  POD 2 - 7/31: hypoglycemia - started on D5W-LR, VTE ppx with heparin SQ    Acute respiratory failure with hypoxia (HCC)  Assessment & Plan  Post op - 10-15L  On 2L 7/30 -- wean off  Elevated procal -- already on empiric abx    Hypoglycemia  Assessment & Plan  NPO status postop, has NG  Started D5W-LR 7/31 on  POD2    Insomnia- (present on admission)  Assessment & Plan  PRN Ambien    Chronic back pain- (present on admission)  Assessment & Plan  Gabapentin, other PRN pain meds ordered    Moderate protein-calorie malnutrition (HCC)  Assessment & Plan  Resume diet when cleared by surgery    Dehydration  Assessment & Plan  IVF    Leukocytosis  Assessment & Plan  Resolved  18k at another facility    Sepsis (HCC)- (present on admission)  Assessment & Plan  RESOVLED    IVF, abx  Likely not true sepsis, but secondary to SBO, antibiotic for now, may de-escalate pending clinical course         VTE prophylaxis: heparin SQ    I have performed a physical exam and reviewed and updated ROS and Plan today (7/31/2021). In review of yesterday's note (7/30/2021), there are no changes except as documented above.

## 2021-07-31 NOTE — CARE PLAN
Problem: Knowledge Deficit - Standard  Goal: Patient and family/care givers will demonstrate understanding of plan of care, disease process/condition, diagnostic tests and medications  Outcome: Progressing     Problem: Pain - Standard  Goal: Alleviation of pain or a reduction in pain to the patient’s comfort goal  Outcome: Progressing     Problem: Hemodynamics  Goal: Patient's hemodynamics, fluid balance and neurologic status will be stable or improve  Outcome: Progressing     Problem: Fluid Volume  Goal: Fluid volume balance will be maintained  Outcome: Progressing     Problem: Urinary - Renal Perfusion  Goal: Ability to achieve and maintain adequate renal perfusion and functioning will improve  Outcome: Progressing     Problem: Mechanical Ventilation  Goal: Safe management of artificial airway and ventilation  Outcome: Progressing  Goal: Successful weaning off mechanical ventilator, spontaneously maintains adequate gas exchange  Outcome: Progressing  Goal: Patient will be able to express needs and understand communication  Outcome: Progressing     Problem: Physical Regulation  Goal: Diagnostic test results will improve  Outcome: Progressing  Goal: Signs and symptoms of infection will decrease  Outcome: Progressing   The patient is Stable - Low risk of patient condition declining or worsening    Shift Goals  Clinical Goals: Ambulation with staff, pass gas  Patient Goals: Ambulation, rest  Family Goals: NA    Progress made toward(s) clinical / shift goals:  pain control, ambulation with staff    Patient is not progressing towards the following goals:

## 2021-07-31 NOTE — FACE TO FACE
Face to Face Supporting Documentation - Home Health    The encounter with this patient was in whole or in part the primary reason for home health admission.    Date of encounter:   Patient:                    MRN:                       YOB: 2022  Reynaldo Dodge  [id number]  1945     Home health to see patient for:  Skilled Nursing care for assessment, interventions & education and Home health aide    Skilled need for:  Surgical Aftercare ex lap, SBO adhesion lysis    Skilled nursing interventions to include:  Wound Care    Homebound status evidenced by:  Have a condition such that leaving his or her home is medically contraindicated. Leaving home requires a considerable and taxing effort. There is a normal inability to leave the home.    Community Physician to provide follow up care: No primary care provider on file.     Optional Interventions? No      I certify the face to face encounter for this home health care referral meets the CMS requirements and the encounter/clinical assessment with the patient was, in whole, or in part, for the medical condition(s) listed above, which is the primary reason for home health care. Based on my clinical findings: the service(s) are medically necessary, support the need for home health care, and the homebound criteria are met.  I certify that this patient has had a face to face encounter by the clinical nurse specialist working collaboratively with me.  Doc Barnhart M.D. - NPI: [id number]

## 2021-08-01 PROBLEM — E83.42 HYPOMAGNESEMIA: Status: ACTIVE | Noted: 2021-08-01

## 2021-08-01 PROBLEM — E87.6 HYPOKALEMIA: Status: ACTIVE | Noted: 2021-08-01

## 2021-08-01 LAB
ALBUMIN SERPL BCP-MCNC: 3.5 G/DL (ref 3.2–4.9)
BASOPHILS # BLD AUTO: 0.3 % (ref 0–1.8)
BASOPHILS # BLD: 0.02 K/UL (ref 0–0.12)
BUN SERPL-MCNC: 18 MG/DL (ref 8–22)
CALCIUM SERPL-MCNC: 8.3 MG/DL (ref 8.5–10.5)
CHLORIDE SERPL-SCNC: 104 MMOL/L (ref 96–112)
CO2 SERPL-SCNC: 22 MMOL/L (ref 20–33)
CREAT SERPL-MCNC: 0.57 MG/DL (ref 0.5–1.4)
EOSINOPHIL # BLD AUTO: 0.26 K/UL (ref 0–0.51)
EOSINOPHIL NFR BLD: 3.7 % (ref 0–6.9)
ERYTHROCYTE [DISTWIDTH] IN BLOOD BY AUTOMATED COUNT: 39.6 FL (ref 35.9–50)
GLUCOSE BLD-MCNC: 124 MG/DL (ref 65–99)
GLUCOSE BLD-MCNC: 143 MG/DL (ref 65–99)
GLUCOSE BLD-MCNC: 89 MG/DL (ref 65–99)
GLUCOSE BLD-MCNC: 95 MG/DL (ref 65–99)
GLUCOSE SERPL-MCNC: 111 MG/DL (ref 65–99)
HCT VFR BLD AUTO: 43.8 % (ref 42–52)
HGB BLD-MCNC: 15.4 G/DL (ref 14–18)
IMM GRANULOCYTES # BLD AUTO: 0.02 K/UL (ref 0–0.11)
IMM GRANULOCYTES NFR BLD AUTO: 0.3 % (ref 0–0.9)
LYMPHOCYTES # BLD AUTO: 0.98 K/UL (ref 1–4.8)
LYMPHOCYTES NFR BLD: 14.1 % (ref 22–41)
MAGNESIUM SERPL-MCNC: 1.9 MG/DL (ref 1.5–2.5)
MCH RBC QN AUTO: 29.1 PG (ref 27–33)
MCHC RBC AUTO-ENTMCNC: 35.2 G/DL (ref 33.7–35.3)
MCV RBC AUTO: 82.8 FL (ref 81.4–97.8)
MONOCYTES # BLD AUTO: 0.47 K/UL (ref 0–0.85)
MONOCYTES NFR BLD AUTO: 6.8 % (ref 0–13.4)
NEUTROPHILS # BLD AUTO: 5.2 K/UL (ref 1.82–7.42)
NEUTROPHILS NFR BLD: 74.8 % (ref 44–72)
NRBC # BLD AUTO: 0 K/UL
NRBC BLD-RTO: 0 /100 WBC
PHOSPHATE SERPL-MCNC: 1.9 MG/DL (ref 2.5–4.5)
PLATELET # BLD AUTO: 164 K/UL (ref 164–446)
PMV BLD AUTO: 10.1 FL (ref 9–12.9)
POTASSIUM SERPL-SCNC: 3.4 MMOL/L (ref 3.6–5.5)
RBC # BLD AUTO: 5.29 M/UL (ref 4.7–6.1)
SODIUM SERPL-SCNC: 138 MMOL/L (ref 135–145)
WBC # BLD AUTO: 7 K/UL (ref 4.8–10.8)

## 2021-08-01 PROCEDURE — 80069 RENAL FUNCTION PANEL: CPT

## 2021-08-01 PROCEDURE — 83735 ASSAY OF MAGNESIUM: CPT

## 2021-08-01 PROCEDURE — 700111 HCHG RX REV CODE 636 W/ 250 OVERRIDE (IP): Performed by: STUDENT IN AN ORGANIZED HEALTH CARE EDUCATION/TRAINING PROGRAM

## 2021-08-01 PROCEDURE — 82962 GLUCOSE BLOOD TEST: CPT | Mod: 91

## 2021-08-01 PROCEDURE — 770001 HCHG ROOM/CARE - MED/SURG/GYN PRIV*

## 2021-08-01 PROCEDURE — 85025 COMPLETE CBC W/AUTO DIFF WBC: CPT

## 2021-08-01 PROCEDURE — 36415 COLL VENOUS BLD VENIPUNCTURE: CPT

## 2021-08-01 PROCEDURE — 700101 HCHG RX REV CODE 250: Performed by: STUDENT IN AN ORGANIZED HEALTH CARE EDUCATION/TRAINING PROGRAM

## 2021-08-01 PROCEDURE — 99232 SBSQ HOSP IP/OBS MODERATE 35: CPT | Performed by: STUDENT IN AN ORGANIZED HEALTH CARE EDUCATION/TRAINING PROGRAM

## 2021-08-01 PROCEDURE — 700105 HCHG RX REV CODE 258: Performed by: STUDENT IN AN ORGANIZED HEALTH CARE EDUCATION/TRAINING PROGRAM

## 2021-08-01 PROCEDURE — A9270 NON-COVERED ITEM OR SERVICE: HCPCS | Performed by: STUDENT IN AN ORGANIZED HEALTH CARE EDUCATION/TRAINING PROGRAM

## 2021-08-01 PROCEDURE — 700102 HCHG RX REV CODE 250 W/ 637 OVERRIDE(OP): Performed by: STUDENT IN AN ORGANIZED HEALTH CARE EDUCATION/TRAINING PROGRAM

## 2021-08-01 RX ORDER — HYDRALAZINE HYDROCHLORIDE 20 MG/ML
10 INJECTION INTRAMUSCULAR; INTRAVENOUS EVERY 4 HOURS PRN
Status: DISCONTINUED | OUTPATIENT
Start: 2021-08-01 | End: 2021-08-02 | Stop reason: HOSPADM

## 2021-08-01 RX ORDER — LABETALOL HYDROCHLORIDE 5 MG/ML
10 INJECTION, SOLUTION INTRAVENOUS EVERY 4 HOURS PRN
Status: DISCONTINUED | OUTPATIENT
Start: 2021-08-01 | End: 2021-08-01

## 2021-08-01 RX ORDER — POTASSIUM CHLORIDE 7.45 MG/ML
10 INJECTION INTRAVENOUS
Status: COMPLETED | OUTPATIENT
Start: 2021-08-01 | End: 2021-08-01

## 2021-08-01 RX ADMIN — FAMOTIDINE 20 MG: 10 INJECTION INTRAVENOUS at 16:43

## 2021-08-01 RX ADMIN — GABAPENTIN 800 MG: 400 CAPSULE ORAL at 05:22

## 2021-08-01 RX ADMIN — FAMOTIDINE 20 MG: 10 INJECTION INTRAVENOUS at 05:22

## 2021-08-01 RX ADMIN — HEPARIN SODIUM 5000 UNITS: 5000 INJECTION, SOLUTION INTRAVENOUS; SUBCUTANEOUS at 05:22

## 2021-08-01 RX ADMIN — DOCUSATE SODIUM 50 MG AND SENNOSIDES 8.6 MG 2 TABLET: 8.6; 5 TABLET, FILM COATED ORAL at 16:43

## 2021-08-01 RX ADMIN — POTASSIUM CHLORIDE 10 MEQ: 7.46 INJECTION, SOLUTION INTRAVENOUS at 08:54

## 2021-08-01 RX ADMIN — DOCUSATE SODIUM 50 MG AND SENNOSIDES 8.6 MG 2 TABLET: 8.6; 5 TABLET, FILM COATED ORAL at 05:22

## 2021-08-01 RX ADMIN — HEPARIN SODIUM 5000 UNITS: 5000 INJECTION, SOLUTION INTRAVENOUS; SUBCUTANEOUS at 22:01

## 2021-08-01 RX ADMIN — HEPARIN SODIUM 5000 UNITS: 5000 INJECTION, SOLUTION INTRAVENOUS; SUBCUTANEOUS at 15:04

## 2021-08-01 RX ADMIN — CEFTRIAXONE SODIUM 2 G: 10 INJECTION, POWDER, FOR SOLUTION INTRAVENOUS at 05:22

## 2021-08-01 RX ADMIN — POTASSIUM CHLORIDE 10 MEQ: 7.46 INJECTION, SOLUTION INTRAVENOUS at 07:51

## 2021-08-01 RX ADMIN — SODIUM CHLORIDE, SODIUM LACTATE, POTASSIUM CHLORIDE, CALCIUM CHLORIDE AND DEXTROSE MONOHYDRATE: 5; 600; 310; 30; 20 INJECTION, SOLUTION INTRAVENOUS at 03:07

## 2021-08-01 RX ADMIN — POTASSIUM PHOSPHATE, MONOBASIC AND POTASSIUM PHOSPHATE, DIBASIC 15 MMOL: 224; 236 INJECTION, SOLUTION, CONCENTRATE INTRAVENOUS at 10:28

## 2021-08-01 RX ADMIN — METRONIDAZOLE 500 MG: 500 INJECTION, SOLUTION INTRAVENOUS at 05:22

## 2021-08-01 ASSESSMENT — ENCOUNTER SYMPTOMS
SPEECH CHANGE: 0
FLANK PAIN: 0
FEVER: 0
BLURRED VISION: 0
PALPITATIONS: 0
SHORTNESS OF BREATH: 0
FALLS: 0
COUGH: 0
CONSTIPATION: 1
BRUISES/BLEEDS EASILY: 0
ABDOMINAL PAIN: 1
DEPRESSION: 0
DIARRHEA: 0
MYALGIAS: 0
FOCAL WEAKNESS: 0
DIZZINESS: 0
HEADACHES: 0
HEARTBURN: 1
VOMITING: 1
NAUSEA: 1
DOUBLE VISION: 0
CHILLS: 0

## 2021-08-01 ASSESSMENT — PAIN DESCRIPTION - PAIN TYPE
TYPE: SURGICAL PAIN
TYPE: ACUTE PAIN
TYPE: SURGICAL PAIN

## 2021-08-01 NOTE — CARE PLAN
Problem: Knowledge Deficit - Standard  Goal: Patient and family/care givers will demonstrate understanding of plan of care, disease process/condition, diagnostic tests and medications  Outcome: Progressing     Problem: Pain - Standard  Goal: Alleviation of pain or a reduction in pain to the patient’s comfort goal  Outcome: Progressing     Problem: Fluid Volume  Goal: Fluid volume balance will be maintained  Outcome: Progressing     Problem: Respiratory  Goal: Patient will achieve/maintain optimum respiratory ventilation and gas exchange  Outcome: Progressing     The patient is Stable - Low risk of patient condition declining or worsening    Shift Goals  Clinical Goals: clamp NGT  Patient Goals: rest  Family Goals: NA    Progress made toward(s) clinical / shift goals: No c/o nausea w/NGT clamped    Patient is not progressing towards the following goals:

## 2021-08-01 NOTE — PROGRESS NOTES
Hospital Medicine Daily Progress Note    Date of Service  8/1/2021    Chief Complaint  Reynaldo Dodge is a 76 y.o. male admitted 7/29/2021 with nausea, vomiting, abdominal discomfort for 48 hours.    Hospital Course  Mr. Reynaldo Dodge is a 76-year-old male who had ERCP and lap cholecystectomy a 2 months prior to [facility], who presented to outstanding ER with complains of nausea vomit abdominal pain 40 hours.  He was found to have a distal small bowel obstruction, subsequently transferred to [facility] for surgical evaluation as [facility] had no bed available.    Interval Problem Update  7/29 I consulted Dr. Landin.  Patient is taken to the OR for surgical intervention today.  Continue empiric antibiotic coverage and IV fluid.  Postop pain control as per surgery.  Await surgical findings.    7/30: POD 1 - doing well, AAO x4, NG putting out bilious output. Pain controlled. No leukocytosis. Currently on 2L oxygen. Postop pain control and diet as per surgery.    7/31: POD 2 - doing well, ~400cc bilious output by NG. Pain is adequately controlled. Advance diet when cleared by surgery. Persistent hypoglycemia, switch LR IVF to D5W-LR infusion. Continue supportive care.    8/1: POD 3 - NGT clamped yesterday, no drainage today. Tolerating CLD, likely diet advancement today. +flautaus and small BM. D/c antibiotic. Anticipate possible discharge.    I have personally seen and examined the patient at bedside. I discussed the plan of care with patient and family.    Consultants/Specialty  Surgery ([facility])    Code Status  Full Code    Disposition  Patient is not medically cleared.   Anticipate discharge to home with .  I have placed the appropriate orders for post-discharge needs.    Review of Systems  Review of Systems   Constitutional: Positive for malaise/fatigue. Negative for chills and fever.   HENT: Negative for hearing loss and tinnitus.    Eyes: Negative for blurred vision and  double vision.   Respiratory: Negative for cough and shortness of breath.    Cardiovascular: Negative for chest pain, palpitations and leg swelling.   Gastrointestinal: Positive for abdominal pain, constipation, heartburn, nausea and vomiting. Negative for diarrhea.   Genitourinary: Negative for dysuria and flank pain.   Musculoskeletal: Negative for falls and myalgias.   Skin: Negative for itching and rash.   Neurological: Negative for dizziness, speech change, focal weakness and headaches.   Endo/Heme/Allergies: Negative for environmental allergies. Does not bruise/bleed easily.   Psychiatric/Behavioral: Negative for depression and suicidal ideas.   All other systems reviewed and are negative.       Physical Exam  Temp:  [36.6 °C (97.9 °F)-37.1 °C (98.7 °F)] 36.6 °C (97.9 °F)  Pulse:  [70-82] 82  Resp:  [16-20] 18  BP: (113-165)/(82-91) 153/84  SpO2:  [93 %-96 %] 93 %    Physical Exam  Constitutional:       Appearance: He is ill-appearing.   HENT:      Head: Normocephalic and atraumatic.      Nose: Nose normal.      Comments: NG in place (clamped)     Mouth/Throat:      Mouth: Mucous membranes are dry.      Pharynx: Oropharynx is clear.   Eyes:      General: No scleral icterus.     Extraocular Movements: Extraocular movements intact.   Cardiovascular:      Rate and Rhythm: Normal rate and regular rhythm.      Pulses: Normal pulses.      Heart sounds: No friction rub.   Pulmonary:      Comments: Decreased bibasilar breath sounds, mild inspiratory Rales  Abdominal:      Comments: Distended, generalized tenderness  Abd wound dressing noted, no oozing of blood or pus seen  No guarding, no rebound, BS present   Musculoskeletal:         General: No swelling or tenderness. Normal range of motion.      Cervical back: Neck supple. No tenderness.   Skin:     General: Skin is warm and dry.      Capillary Refill: Capillary refill takes less than 2 seconds.   Neurological:      General: No focal deficit present.      Mental  Status: He is alert and oriented to person, place, and time.      Motor: No weakness.   Psychiatric:         Mood and Affect: Mood normal.         Fluids    Intake/Output Summary (Last 24 hours) at 8/1/2021 1026  Last data filed at 8/1/2021 0944  Gross per 24 hour   Intake 1560 ml   Output 900 ml   Net 660 ml       Laboratory  Recent Labs     07/30/21  0553 07/31/21  0510 08/01/21  0315   WBC 5.9 5.3 7.0   RBC 5.01 4.94 5.29   HEMOGLOBIN 14.5 14.2 15.4   HEMATOCRIT 44.3 42.2 43.8   MCV 88.4 85.4 82.8   MCH 28.9 28.7 29.1   MCHC 32.7* 33.6* 35.2   RDW 45.2 41.7 39.6   PLATELETCT 161* 143* 164   MPV 10.0 10.2 10.1     Recent Labs     07/30/21  0553 07/31/21  0510 08/01/21  0315   SODIUM 141 141 138   POTASSIUM 4.1 3.7 3.4*   CHLORIDE 103 106 104   CO2 28 23 22   GLUCOSE 92 77 111*   BUN 38* 29* 18   CREATININE 0.76 0.70 0.57   CALCIUM 8.6 8.3* 8.3*             Recent Labs     07/30/21  0553   TRIGLYCERIDE 124   HDL 45   LDL 38       Imaging  IR-US GUIDED PIV   Final Result    Ultrasound-guided PERIPHERAL IV INSERTION performed by    qualified nursing staff as above.      OUTSIDE IMAGES-DX CHEST   Final Result      OUTSIDE IMAGES-CT ABDOMEN /PELVIS   Final Result      DX-ABDOMEN FOR TUBE PLACEMENT   Final Result      1.  Nasogastric tube tip terminates in the stomach region with the side port near the GE junction. Recommend mild enhancement.   2.  Dilated small bowel.           Assessment/Plan  * Small bowel obstruction (HCC)- (present on admission)  Assessment & Plan  Recent ERCP and lap cholecystectomy at [facility] 2 months prior to admission    CTAP notable for distal SBO  Bowel rest  NG clamped  IVF  Pain control    Surgery f/u appreciated  S/p exploratory laparotomy with lysis of adhesions and reduction of internal hernia 7/29/2021 by Dr. Landin  POD 1 - 7/30: pain controlled, persistent NG bilious output, no leukocytosis, IVF  POD 2 - 7/31: hypoglycemia - started on D5W-LR, VTE ppx with heparin SQ  POD 3 -  8/1: tolerating CLD, advance as per surgery    Acute respiratory failure with hypoxia (HCC)  Assessment & Plan  RESOLVED -- RA 8/1    Post op - 10-15L  On 2L 7/30 -- wean off  Elevated procal -- already on empiric abx    Hypoglycemia  Assessment & Plan  NPO status postop, has NG  Started D5W-LR 7/31 on POD2    Insomnia- (present on admission)  Assessment & Plan  PRN Ambien    Chronic back pain- (present on admission)  Assessment & Plan  Gabapentin, other PRN pain meds ordered    Hypomagnesemia  Assessment & Plan  replace    Hypokalemia  Assessment & Plan  replace    Moderate protein-calorie malnutrition (HCC)  Assessment & Plan  Diet as per surgery    Dehydration  Assessment & Plan  IVF    Leukocytosis  Assessment & Plan  Resolved  18k at another facility    Sepsis (HCC)- (present on admission)  Assessment & Plan  SIRS criteria, not true sepsis  S/p empiric ceftriaxone+flagyl (7/29-8/1)         VTE prophylaxis: heparin SQ    I have performed a physical exam and reviewed and updated ROS and Plan today (8/1/2021). In review of yesterday's note (7/31/2021), there are no changes except as documented above.

## 2021-08-01 NOTE — PROGRESS NOTES
Pt A&Ox4. Room air. Ambulates SBA. Denies N/V. Tolerating full liquid diet. Removed NG tube this morning. Had large bowel movement this afternoon. Denies pain. Midline incision CDI with staples and GAUDENCIO.

## 2021-08-01 NOTE — DOCUMENTATION QUERY
"                                                                         [facility]                                                                       Query Response Note      PATIENT:               HARRY DAILEY  ACCT #:                  [id number]  MRN:                     [id number]  :                      1945  ADMIT DATE:       2021 3:09 AM  DISCH DATE:          RESPONDING  PROVIDER #:        [id number]           QUERY TEXT:    \"Likely not true sepsis\" has been reported in the Progress Notes. Per the Medical Record there appears to be conflicting information regarding the presence of sepsis. Please clarify the status of this condition.     NOTE:  If an appropriate response is not listed below, please respond with a new note.    The patient's Clinical Indicators include:  Per H&P   -Upon admission, abnormal labs include tbili 1.9, wbc 18, HGB 18.89, glucose 165, bun 41 Cr 0.83, Lactic acid 2.5.   -Sepsis Present on admission   -SIRS criteria include:   -Fever, with temperature greater than 101 deg F,   -Tachycardia, with heart rate greater than 90 BPM,  -Tachypnea, with respirations greater than 20 per minute,   -Leukocytosis, with WBC greater than 12,000   -and Bandemia, greater than 10% band  -Admit Vital Signs: 98.2, 104, 14, 150/80, 96%    Per Surgical Consultation  -Of concern is white count elevation of 18,000  -does have a mild resting tachycardia    Per Progress Notes   -Leukocytosis, 18k at another facility, resolved   -Likely not true sepsis, but secondary to SBO  -antibiotic for now    Per Progress Note   -Elevated procal -- already on empiric abx   -Sepsis Resolved     Results Review:   WBC 9.0, 5.9, 5.3, 7.0  lactic acid 1.7, 1.6, 1.5, 1.8  Procalcitonin 0.69  Blood Cultures: Pending-negative to date     Vital Sign Range:   Temp Max 98.2, Min 97.1  HR Max 104, Min 61  RR Max 10, Min 20  BP Max 165/89, Min 113/82    Treatment:   Rocephin, Flagyl, & Surgical intervention s/p " exploratory laparotomy with reduction of internal hernia & correction of volvulus with enterolysis    Risk Factors:   Complete small bowel obstruction, tachycardia, & leukocytosis at outside facility     Thank You,  Mira Daniels RN BSN  Clinical   Connect via [employer] Messenger  Options provided:   -- Sepsis is ruled in   -- Sepsis has been ruled out; SIRS of non-infectious origin   -- Sepsis/SIRS ruled out   -- Unable to determine      Query created by: Mira Daniels on 8/1/2021 8:39 AM    RESPONSE TEXT:    Sepsis has been ruled out; SIRS of non-infectious origin          Electronically signed by:  SOMMER ROMANO MD 8/1/2021 9:49 AM

## 2021-08-01 NOTE — PROGRESS NOTES
POD3 ex-lap, ALEX  Doing well today, pain continues to improve. +flatus, tolerating clears with NGT clamped since yesterday  Afebrile, HR 80s  WBC 7  Abdomen soft, incision c/d/i    Plan:  Appreciate medical care  dvt prophylaxis  OOB  DC NGT  Ok for CLD/FLD today as tolerated  Prn analgesia

## 2021-08-01 NOTE — FACE TO FACE
Face to Face Supporting Documentation - Home Health    The encounter with this patient was in whole or in part the primary reason for home health admission.    Date of encounter:   Patient:                    MRN:                       YOB: 2022  Reynaldo Dodge  [id number]  1945     Home health to see patient for:  Skilled Nursing care for assessment, interventions & education and Wound Care    Skilled need for:  Surgical Aftercare ex lap, lysis of adhesion    Skilled nursing interventions to include:  Wound Care    Homebound status evidenced by:  Have a condition such that leaving his or her home is medically contraindicated. Leaving home requires a considerable and taxing effort. There is a normal inability to leave the home.    Community Physician to provide follow up care: No primary care provider on file.     Optional Interventions? No      I certify the face to face encounter for this home health care referral meets the CMS requirements and the encounter/clinical assessment with the patient was, in whole, or in part, for the medical condition(s) listed above, which is the primary reason for home health care. Based on my clinical findings: the service(s) are medically necessary, support the need for home health care, and the homebound criteria are met.  I certify that this patient has had a face to face encounter by the clinical nurse specialist working collaboratively with me.  Doc Barnhart M.D. - NPI: [id number]

## 2021-08-01 NOTE — DISCHARGE PLANNING
Received Choice form at [phone]  Agency/Facility Name: [facility]  Referral sent per Choice form @ [phone]

## 2021-08-01 NOTE — CARE PLAN
The patient is Stable - Low risk of patient condition declining or worsening    Shift Goals  Clinical Goals: ambulate, have a bowel movement  Patient Goals: remove NGT  Family Goals: NA    Progress made toward(s) clinical / shift goals:        Problem: Knowledge Deficit - Standard  Goal: Patient and family/care givers will demonstrate understanding of plan of care, disease process/condition, diagnostic tests and medications  Outcome: Progressing     Pt updated this morning regarding plan for the day including more ambulation and controlling pain.     Problem: Pain - Standard  Goal: Alleviation of pain or a reduction in pain to the patient’s comfort goal  Outcome: Progressing    Pt c/o mild abdominal pain. Denies need for PRN pain medication.       Patient is not progressing towards the following goals:

## 2021-08-02 VITALS
RESPIRATION RATE: 18 BRPM | TEMPERATURE: 97.7 F | DIASTOLIC BLOOD PRESSURE: 94 MMHG | BODY MASS INDEX: 25.57 KG/M2 | SYSTOLIC BLOOD PRESSURE: 146 MMHG | HEIGHT: 69 IN | OXYGEN SATURATION: 91 % | WEIGHT: 172.62 LBS | HEART RATE: 73 BPM

## 2021-08-02 LAB
ALBUMIN SERPL BCP-MCNC: 3.4 G/DL (ref 3.2–4.9)
BASOPHILS # BLD AUTO: 0.6 % (ref 0–1.8)
BASOPHILS # BLD: 0.03 K/UL (ref 0–0.12)
BUN SERPL-MCNC: 12 MG/DL (ref 8–22)
CALCIUM SERPL-MCNC: 8.7 MG/DL (ref 8.5–10.5)
CHLORIDE SERPL-SCNC: 108 MMOL/L (ref 96–112)
CO2 SERPL-SCNC: 18 MMOL/L (ref 20–33)
CREAT SERPL-MCNC: 0.73 MG/DL (ref 0.5–1.4)
EOSINOPHIL # BLD AUTO: 0.37 K/UL (ref 0–0.51)
EOSINOPHIL NFR BLD: 6.8 % (ref 0–6.9)
ERYTHROCYTE [DISTWIDTH] IN BLOOD BY AUTOMATED COUNT: 39.8 FL (ref 35.9–50)
GLUCOSE BLD-MCNC: 108 MG/DL (ref 65–99)
GLUCOSE BLD-MCNC: 117 MG/DL (ref 65–99)
GLUCOSE BLD-MCNC: 134 MG/DL (ref 65–99)
GLUCOSE BLD-MCNC: 65 MG/DL (ref 65–99)
GLUCOSE BLD-MCNC: 89 MG/DL (ref 65–99)
GLUCOSE SERPL-MCNC: 122 MG/DL (ref 65–99)
HCT VFR BLD AUTO: 42.7 % (ref 42–52)
HGB BLD-MCNC: 15.1 G/DL (ref 14–18)
IMM GRANULOCYTES # BLD AUTO: 0.02 K/UL (ref 0–0.11)
IMM GRANULOCYTES NFR BLD AUTO: 0.4 % (ref 0–0.9)
LYMPHOCYTES # BLD AUTO: 0.95 K/UL (ref 1–4.8)
LYMPHOCYTES NFR BLD: 17.5 % (ref 22–41)
MAGNESIUM SERPL-MCNC: 1.9 MG/DL (ref 1.5–2.5)
MCH RBC QN AUTO: 29.2 PG (ref 27–33)
MCHC RBC AUTO-ENTMCNC: 35.4 G/DL (ref 33.7–35.3)
MCV RBC AUTO: 82.6 FL (ref 81.4–97.8)
MONOCYTES # BLD AUTO: 0.47 K/UL (ref 0–0.85)
MONOCYTES NFR BLD AUTO: 8.6 % (ref 0–13.4)
NEUTROPHILS # BLD AUTO: 3.6 K/UL (ref 1.82–7.42)
NEUTROPHILS NFR BLD: 66.1 % (ref 44–72)
NRBC # BLD AUTO: 0 K/UL
NRBC BLD-RTO: 0 /100 WBC
PHOSPHATE SERPL-MCNC: 2.8 MG/DL (ref 2.5–4.5)
PLATELET # BLD AUTO: 160 K/UL (ref 164–446)
PMV BLD AUTO: 10.4 FL (ref 9–12.9)
POTASSIUM SERPL-SCNC: 4 MMOL/L (ref 3.6–5.5)
RBC # BLD AUTO: 5.17 M/UL (ref 4.7–6.1)
SODIUM SERPL-SCNC: 139 MMOL/L (ref 135–145)
WBC # BLD AUTO: 5.4 K/UL (ref 4.8–10.8)

## 2021-08-02 PROCEDURE — A9270 NON-COVERED ITEM OR SERVICE: HCPCS | Performed by: STUDENT IN AN ORGANIZED HEALTH CARE EDUCATION/TRAINING PROGRAM

## 2021-08-02 PROCEDURE — 700111 HCHG RX REV CODE 636 W/ 250 OVERRIDE (IP): Performed by: STUDENT IN AN ORGANIZED HEALTH CARE EDUCATION/TRAINING PROGRAM

## 2021-08-02 PROCEDURE — 80069 RENAL FUNCTION PANEL: CPT

## 2021-08-02 PROCEDURE — 85025 COMPLETE CBC W/AUTO DIFF WBC: CPT

## 2021-08-02 PROCEDURE — 82962 GLUCOSE BLOOD TEST: CPT | Mod: 91

## 2021-08-02 PROCEDURE — 99239 HOSP IP/OBS DSCHRG MGMT >30: CPT | Performed by: STUDENT IN AN ORGANIZED HEALTH CARE EDUCATION/TRAINING PROGRAM

## 2021-08-02 PROCEDURE — 83735 ASSAY OF MAGNESIUM: CPT

## 2021-08-02 PROCEDURE — 700102 HCHG RX REV CODE 250 W/ 637 OVERRIDE(OP): Performed by: STUDENT IN AN ORGANIZED HEALTH CARE EDUCATION/TRAINING PROGRAM

## 2021-08-02 PROCEDURE — 36415 COLL VENOUS BLD VENIPUNCTURE: CPT

## 2021-08-02 RX ADMIN — GABAPENTIN 800 MG: 400 CAPSULE ORAL at 05:03

## 2021-08-02 RX ADMIN — HEPARIN SODIUM 5000 UNITS: 5000 INJECTION, SOLUTION INTRAVENOUS; SUBCUTANEOUS at 05:03

## 2021-08-02 RX ADMIN — FAMOTIDINE 20 MG: 10 INJECTION INTRAVENOUS at 05:03

## 2021-08-02 ASSESSMENT — PAIN DESCRIPTION - PAIN TYPE
TYPE: ACUTE PAIN
TYPE: ACUTE PAIN
TYPE: SURGICAL PAIN
TYPE: SURGICAL PAIN

## 2021-08-02 NOTE — PROGRESS NOTES
Assumed care at 1845. Pt resting in bed. A&ox 4  Ambulates with SBA  Tolerating full liq diet  Multiple BMS today  O2 on RA  abd midline staples GAUDENCIO  Voiding in urinal  Denies pain  Call light within reach. Hourly rounding in place

## 2021-08-02 NOTE — FACE TO FACE
Face to Face Supporting Documentation - Home Health    The encounter with this patient was in whole or in part the primary reason for home health admission.    Date of encounter:   Patient:                    MRN:                       YOB: 2022  Reynaldo Dodge  [id number]  1945     Home health to see patient for:  Skilled Nursing care for assessment, interventions & education and Wound Care    Skilled need for:  Surgical Aftercare SOB, lysis of adhesion    Skilled nursing interventions to include:  Wound Care    Homebound status evidenced by:  Have a condition such that leaving his or her home is medically contraindicated. Leaving home requires a considerable and taxing effort. There is a normal inability to leave the home.    Community Physician to provide follow up care: Mo Brumfield D.O.     Optional Interventions? No      I certify the face to face encounter for this home health care referral meets the CMS requirements and the encounter/clinical assessment with the patient was, in whole, or in part, for the medical condition(s) listed above, which is the primary reason for home health care. Based on my clinical findings: the service(s) are medically necessary, support the need for home health care, and the homebound criteria are met.  I certify that this patient has had a face to face encounter by the clinical nurse specialist working collaboratively with me.  Doc Barnhart M.D. - NPI: [id number]

## 2021-08-02 NOTE — DISCHARGE INSTRUCTIONS
Discharge Instructions    Discharged to home by car with relative. Discharged via wheelchair, hospital escort: Yes.  Special equipment needed: Not Applicable    Be sure to schedule a follow-up appointment with your primary care doctor or any specialists as instructed.     Discharge Plan:   Diet Plan: Discussed  Activity Level: Discussed  Confirmed Follow up Appointment: Appointment Scheduled  Confirmed Symptoms Management: Discussed  Medication Reconciliation Updated: Yes    I understand that a diet low in cholesterol, fat, and sodium is recommended for good health. Unless I have been given specific instructions below for another diet, I accept this instruction as my diet prescription.   Other diet: Regular    Special Instructions: None    · Is patient discharged on Warfarin / Coumadin?   No     Depression / Suicide Risk    As you are discharged from this [facility] facility, it is important to learn how to keep safe from harming yourself.    Recognize the warning signs:  · Abrupt changes in personality, positive or negative- including increase in energy   · Giving away possessions  · Change in eating patterns- significant weight changes-  positive or negative  · Change in sleeping patterns- unable to sleep or sleeping all the time   · Unwillingness or inability to communicate  · Depression  · Unusual sadness, discouragement and loneliness  · Talk of wanting to die  · Neglect of personal appearance   · Rebelliousness- reckless behavior  · Withdrawal from people/activities they love  · Confusion- inability to concentrate     If you or a loved one observes any of these behaviors or has concerns about self-harm, here's what you can do:  · Talk about it- your feelings and reasons for harming yourself  · Remove any means that you might use to hurt yourself (examples: pills, rope, extension cords, firearm)  · Get professional help from the community (Mental Health, Substance Abuse, psychological counseling)  · Do not  be alone:Call your Safe Contact- someone whom you trust who will be there for you.  · Call your local CRISIS HOTLINE 900-0281 or 241-452-5047  · Call your local Children's Mobile Crisis Response Team [place] (315) 901-8781 or www.DigiFit  · Call the toll free National Suicide Prevention Hotlines   · National Suicide Prevention Lifeline 623-236-QTTK (5387)  · [employer] 800-SUICIDE (854-1173)          Bowel Obstruction  A bowel obstruction means that something is blocking the small or large bowel. The bowel is also called the intestine. It is the long tube that connects the stomach to the opening of the butt (anus). When something blocks the bowel, food and fluids cannot pass through like normal. This condition needs to be treated. Treatment depends on the cause of the problem and how bad the problem is.  What are the causes?  Common causes of this condition include:  · Scar tissue (adhesions) from past surgery or from high-energy X-rays (radiation).  · Recent surgery in the belly. This affects how food moves in the bowel.  · Some diseases, such as:  ? Irritation of the lining of the digestive tract (Crohn's disease).  ? Irritation of small pouches in the bowel (diverticulitis).  · Growths or tumors.  · A bulging organ (hernia).  · Twisting of the bowel (volvulus).  · A foreign body.  · Slipping of a part of the bowel into another part (intussusception).  What are the signs or symptoms?  Symptoms of this condition include:  · Pain in the belly.  · Feeling sick to your stomach (nauseous).  · Throwing up (vomiting).  · Bloating in the belly.  · Being unable to pass gas.  · Trouble pooping (constipation).  · Watery poop (diarrhea).  · A lot of belching.  How is this diagnosed?  This condition may be diagnosed based on:  · A physical exam.  · Medical history.  · Imaging tests, such as X-ray or CT scan.  · Blood tests.  · Urine tests.  How is this treated?  Treatment for this condition may  include:  · Fluids and pain medicines that are given through an IV tube. Your doctor may tell you not to eat or drink if you feel sick to your stomach and are throwing up.  · Eating a clear liquid diet for a few days.  · Putting a small tube (nasogastric tube) into the stomach. This will help with pain, discomfort, and nausea by removing blocked air and fluids from the stomach.  · Surgery. This may be needed if other treatments do not work.  Follow these instructions at home:  Medicines  · Take over-the-counter and prescription medicines only as told by your doctor.  · If you were prescribed an antibiotic medicine, take it as told by your doctor. Do not stop taking the antibiotic even if you start to feel better.  General instructions  · Follow your diet as told by your doctor. You may need to:  ? Only drink clear liquids until you start to get better.  ? Avoid solid foods.  · Return to your normal activities as told by your doctor. Ask your doctor what activities are safe for you.  · Do not sit for a long time without moving. Get up to take short walks every 1-2 hours. This is important. Ask for help if you feel weak or unsteady.  · Keep all follow-up visits as told by your doctor. This is important.  How is this prevented?  After having a bowel obstruction, you may be more likely to have another. You can do some things to stop it from happening again.  · If you have a long-term (chronic) disease, contact your doctor if you see changes or problems.  · Take steps to prevent or treat trouble pooping. Your doctor may ask that you:  ? Drink enough fluid to keep your pee (urine) pale yellow.  ? Take over-the-counter or prescription medicines.  ? Eat foods that are high in fiber. These include beans, whole grains, and fresh fruits and vegetables.  ? Limit foods that are high in fat and sugar. These include fried or sweet foods.  · Stay active. Ask your doctor which exercises are safe for you.  · Avoid stress.  · Eat  three small meals and three small snacks each day.  · Work with a food expert (dietitian) to make a meal plan that works for you.  · Do not use any products that contain nicotine or tobacco, such as cigarettes and e-cigarettes. If you need help quitting, ask your doctor.  Contact a doctor if:  · You have a fever.  · You have chills.  Get help right away if:  · You have pain or cramps that get worse.  · You throw up blood.  · You are sick to your stomach.  · You cannot stop throwing up.  · You cannot drink fluids.  · You feel mixed up (confused).  · You feel very thirsty (dehydrated).  · Your belly gets more bloated.  · You feel weak or you pass out (faint).  Summary  · A bowel obstruction means that something is blocking the small or large bowel.  · Treatment may include IV fluids and pain medicine. You may also have a clear liquid diet, a small tube in your stomach, or surgery.  · Drink clear liquids and avoid solid foods until you get better.  This information is not intended to replace advice given to you by your health care provider. Make sure you discuss any questions you have with your health care provider.  Document Released: 01/25/2006 Document Revised: 05/01/2019 Document Reviewed: 05/01/2019  [employer] Patient Education © 2020 [employer]      Incision Care, Adult  An incision is a cut that a doctor makes in your skin for surgery (for a procedure). Most times, these cuts are closed after surgery. Your cut from surgery may be closed with stitches (sutures), staples, skin glue, or skin tape (adhesive strips). You may need to return to your doctor to have stitches or staples taken out. This may happen many days or many weeks after your surgery. The cut needs to be well cared for so it does not get infected.  How to care for your cut  Cut care    · Follow instructions from your doctor about how to take care of your cut. Make sure you:  ? Wash your hands with soap and water before you change your bandage  (dressing). If you cannot use soap and water, use hand .  ? Change your bandage as told by your doctor.  ? Leave stitches, skin glue, or skin tape in place. They may need to stay in place for 2 weeks or longer. If tape strips get loose and curl up, you may trim the loose edges. Do not remove tape strips completely unless your doctor says it is okay.  · Check your cut area every day for signs of infection. Check for:  ? More redness, swelling, or pain.  ? More fluid or blood.  ? Warmth.  ? Pus or a bad smell.  · Ask your doctor how to clean the cut. This may include:  ? Using mild soap and water.  ? Using a clean towel to pat the cut dry after you clean it.  ? Putting a cream or ointment on the cut. Do this only as told by your doctor.  ? Covering the cut with a clean bandage.  · Ask your doctor when you can leave the cut uncovered.  · Do not take baths, swim, or use a hot tub until your doctor says it is okay. Ask your doctor if you can take showers. You may only be allowed to take sponge baths for bathing.  Medicines  · If you were prescribed an antibiotic medicine, cream, or ointment, take the antibiotic or put it on the cut as told by your doctor. Do not stop taking or putting on the antibiotic even if your condition gets better.  · Take over-the-counter and prescription medicines only as told by your doctor.  General instructions  · Limit movement around your cut. This helps healing.  ? Avoid straining, lifting, or exercise for the first month, or for as long as told by your doctor.  ? Follow instructions from your doctor about going back to your normal activities.  ? Ask your doctor what activities are safe.  · Protect your cut from the sun when you are outside for the first 6 months, or for as long as told by your doctor. Put on sunscreen around the scar or cover up the scar.  · Keep all follow-up visits as told by your doctor. This is important.  Contact a doctor if:  · Your have more redness,  swelling, or pain around the cut.  · You have more fluid or blood coming from the cut.  · Your cut feels warm to the touch.  · You have pus or a bad smell coming from the cut.  · You have a fever or shaking chills.  · You feel sick to your stomach (nauseous) or you throw up (vomit).  · You are dizzy.  · Your stitches or staples come undone.  Get help right away if:  · You have a red streak coming from your cut.  · Your cut bleeds through the bandage and the bleeding does not stop with gentle pressure.  · The edges of your cut open up and separate.  · You have very bad (severe) pain.  · You have a rash.  · You are confused.  · You pass out (faint).  · You have trouble breathing and you have a fast heartbeat.  This information is not intended to replace advice given to you by your health care provider. Make sure you discuss any questions you have with your health care provider.  Document Released: 03/11/2013 Document Revised: 05/07/2018 Document Reviewed: 08/25/2017  Elsevier Patient Education © 2020 [employer].

## 2021-08-02 NOTE — PROGRESS NOTES
Stable  Tolerating po  Multiple BMs  Incision clean  Wants to go home  P  Regular diet  heplock iv  Ok to discharge later today from surgery standpoint

## 2021-08-02 NOTE — PROGRESS NOTES
Report received from RN, assumed care at 0700  Pt is A0X4, and responds appropriately   Pt declines any SOB, chest pain, new onset of numbness/ tingiling  Pt rates pain at 0/10, on a scale of 1-10, pt declines pain and pain medication needs at this time   Pt is voiding adequatly and without hesitancy  Pt has + flatus, + bowel sounds, + BM on 8/2/2021  Pt ambulates with a  Stand by  assist   Pt is tolerating a full liquid diet, pt denies any nausea/vomiting  Pt has a midline incision, approximated with staples, open to air, no drainage noted at this time   Pt has skin tear to L elbow, dressing is in place         Plan of care discussed, all questions answered. Explained importance of calling before getting OOB and pt verbalizes understanding. Explained importance of oral care. Call light is within reach, treaded slipper socks on, bed in lowest/ locked position, hourly rounding in place, all needs met at this time

## 2021-08-02 NOTE — CARE PLAN
The patient is Stable - Low risk of patient condition declining or worsening    Shift Goals  Clinical Goals: ambulate and tolerate diet, pain control  Patient Goals: remove NGT  Family Goals: NA    Progress made toward(s) clinical / shift goals:  ambulated, tolerate diet, discharge planning       Problem: Knowledge Deficit - Standard  Goal: Patient and family/care givers will demonstrate understanding of plan of care, disease process/condition, diagnostic tests and medications  Outcome: Progressing     Problem: Pain - Standard  Goal: Alleviation of pain or a reduction in pain to the patient’s comfort goal  Outcome: Progressing

## 2021-08-02 NOTE — CARE PLAN
The patient is Stable - Low risk of patient condition declining or worsening    Shift Goals  Clinical Goals: ambulate and tolerate diet  Patient Goals: remove NGT  Family Goals: NA    Progress made toward(s) clinical / shift goals: ambulating frequently. Multiple BMs. Denies n/v    Patient is not progressing towards the following goals:      Problem: Knowledge Deficit - Standard  Goal: Patient and family/care givers will demonstrate understanding of plan of care, disease process/condition, diagnostic tests and medications  Outcome: Progressing     Problem: Pain - Standard  Goal: Alleviation of pain or a reduction in pain to the patient’s comfort goal  Outcome: Progressing

## 2021-08-02 NOTE — DISCHARGE PLANNING
.Anticipated Discharge Disposition:   Home with Home Health    Action:   This RN VALERIA is following the case. Requested Franklyn CRUZ to follow up Pt' s acceptance with [facility].    Met with Pt at bedside who states that he lives with his wife Catherine in a one story house [address] [address]. Pt has a walker and cane at home and has oxygen set up thru [facility].    Per Pt his wife is on her way to [facility] and can provide transport to home.  Per Pt his meds are delivered to his home address by his Pharmacy.    11:30 Met with Pt at bedside again and obtained Pt's signature for Release of Information from Medical Records. Pt requested medical records be mailed to his [address] address in [facility].   Release of Information was faxed to [facility]/Medical Records at F [phone].    Informed Pt that his Home Health referral is still being processed. Per Pt he does not need to wait for HH acceptance before being discharged from [facility] . Pt states he will ask his daughter to book him a flight to [place] this afternoon. Informed DEO Walters.       Barriers to Discharge:   Pending medical clearance  Pending Home Health acceptance    Plan:   CM to continue to assist Pt with discharge as needed

## 2021-08-02 NOTE — DISCHARGE PLANNING
Agency/Facility Name: [facility]  Spoke To: Romi   Outcome: Per Romi, referral would be handled by IRIS Mayorga.  P: [phone].  DPA requested Romi forward referral to this office     [phone]  Agency/Facility Name: [facility]  Spoke To: Zina  Outcome: Per Zina, Pt's PCP needs to approve care before [facility] can accept.  [facility] will reach out and call this DPA once PCP accepts.  Zina states there will be no PT/OT avail until next Tuesday -1605  Agency/Facility Name: [facility]  Spoke To: Zina   Outcome: Per Zina, multiple messages were left to Pt's PCP.  Zina will try again tomorrow.  ANTHONY informed Zina that Pt has been D/C'd

## 2021-08-02 NOTE — DISCHARGE SUMMARY
Discharge Summary    CHIEF COMPLAINT ON ADMISSION  Abdominal pain, nausea, vomiting    Reason for Admission  Small bowel obstruction/surgery    Admission Date  7/29/2021    CODE STATUS  Full Code    HPI & HOSPITAL COURSE  Mr. Reynaldo Dodge is a 76-year-old male who had ERCP and lap cholecystectomy a 2 months prior at [facility], who presented to [facility] ER with complains of nausea vomit abdominal pain then transferred to [facility] 7/29/2021 after he was found to have distal SBO on CT AP.  Patient was seen by general surgery this admission during this admission, had OR intervention by surgery 7/29/2021. He was monitored postoperatively, diet advanced slowly -- tolerating, having flautus and BM, and eventually was cleared for discharge by surgery.     Patient resides in [place], [place]. Patient will f/u with either surgery group at [facility], or [facility] if he elects to follow up in [facility]. Home health arranged prior to discharge.    Therefore, he is discharged in good and stable condition to home with close outpatient follow-up.    The patient met 2-midnight criteria for an inpatient stay at the time of discharge.    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 *      FOLLOW UP  No future appointments.  Mo Brumfield,  D.O.  [address] [place] [address]  [phone]    In 1 week      [facility]  [address]  [address]  [place] [id number] [phone]  In 1 week        MEDICATIONS ON DISCHARGE     Medication List      CONTINUE taking these medications      Instructions   aspirin 81 MG Chew chewable tablet  Commonly known as: ASA   Chew 81 mg every day.  Dose: 81 mg     coenzyme Q-10 30 MG capsule   Take  by mouth every day.     cyclobenzaprine 10 mg Tabs  Commonly known as: Flexeril   Take 10 mg by mouth 1 time a day as needed.  Dose: 10 mg     fish oil 1000 MG Caps capsule   Take 1,000 mg by mouth 1 time a day as needed.  Dose: 1,000 mg     FLORAJEN DIGESTION PO   Take  by mouth.     gabapentin 400 MG Caps  Commonly known as: NEURONTIN   Take 800 mg by mouth every day.  Dose: 800 mg     magnesium oxide 400 MG Tabs tablet  Commonly known as: MAG-OX   Take 400 mg by mouth every day.  Dose: 400 mg     montelukast 10 MG Tabs  Commonly known as: SINGULAIR   Take 10 mg by mouth every day.  Dose: 10 mg     pantoprazole 40 MG Tbec  Commonly known as: PROTONIX   Take 40 mg by mouth every day.  Dose: 40 mg     therapeutic multivitamin-minerals Tabs   Take 1 tablet by mouth every day.  Dose: 1 tablet     Vitamin C 1000 MG Tabs   Take  by mouth.     zinc sulfate 220 (50 Zn) MG Caps  Commonly known as: ZINCATE   Take 25 mg by mouth every day.  Dose: 25 mg        STOP taking these medications    meloxicam 7.5 MG Tabs  Commonly known as: MOBIC            Allergies  No Known Allergies    DIET  Orders Placed This Encounter   Procedures   • Diet Order Diet: Full Liquid     Standing Status:   Standing     Number of Occurrences:   1     Order Specific Question:   Diet:     Answer:   Full Liquid [11]       ACTIVITY  As tolerated.  Weight bearing as tolerated    CONSULTATIONS  General surgery    PROCEDURES  Exploratory laparotomy with reduction of internal hernia and correction of volvulus with enterolysis 7/29/2021 by  Dr. Zhou Landin    LABORATORY  Lab Results   Component Value Date    SODIUM 139 08/02/2021    POTASSIUM 4.0 08/02/2021    CHLORIDE 108 08/02/2021    CO2 18 (L) 08/02/2021    GLUCOSE 122 (H) 08/02/2021    BUN 12 08/02/2021    CREATININE 0.73 08/02/2021        Lab Results   Component Value Date    WBC 5.4 08/02/2021    HEMOGLOBIN 15.1 08/02/2021    HEMATOCRIT 42.7 08/02/2021    PLATELETCT 160 (L) 08/02/2021        Total time of the discharge process exceeds 40 minutes.

## 2021-08-03 LAB
BACTERIA BLD CULT: NORMAL
BACTERIA BLD CULT: NORMAL
SIGNIFICANT IND 70042: NORMAL
SIGNIFICANT IND 70042: NORMAL
SITE SITE: NORMAL
SITE SITE: NORMAL
SOURCE SOURCE: NORMAL
SOURCE SOURCE: NORMAL

## 2021-08-09 NOTE — DOCUMENTATION QUERY
[facility]                                                                       Query Response Note      PATIENT:               HARRY DAILEY  ACCT #:                  [id number]  MRN:                     [id number]  :                      1945  ADMIT DATE:       2021 3:09 AM  DISCH DATE:        2021 2:44 PM  RESPONDING  PROVIDER #:        [id number]           QUERY TEXT:    Acute respiratory failure is documented in the Progress Notes. However, the clinical findings do not appear to support the diagnosis of acute respiratory failure. Please clarify the accuracy of this diagnosis.     Note: If an appropriate response is not listed below, please respond with a new note.    The patient's Clinical Indicators include:  Per Vital Sign Flowsheets  -RR 10-20  -SPO2 93-98%  -O2 2-4L via Oxymask    Per Progress Note   -Resp:  [10-18] 10   -SpO2:  [94 %-98 %] 97 %   -Respiratory:  Negative for cough and shortness of breath.   -Pulmonary:  Decreased bibasilar breath sounds, mild inspiratory Rales  -Acute respiratory failure with hypoxia  -Post op - 10-15L     Per Progress Note   -Currently on 2L oxygen.  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 s/p exploratory laparotomy with reduction of internal hernia & correction of volvulus, dehydration, hypoglycemia, SIRS, hypokalemia, hypomagnesemia, & hx of chronic back pain    Thank You,  Mira Daniels RN BSN  Clinical   Connect via ADTELLIGENCE  Options provided:   -- Acute respiratory failure is a valid diagnosis for this  admission, please document additional clinical indicators   -- Acute respiratory failure has been ruled out and amended documentation provided in the medical record   -- Unable to determine      Query created by: Mira Daniels on 8/6/2021 3:39 PM    RESPONSE TEXT:    Acute respiratory failure is a valid diagnosis for this admission -          Electronically signed by:  SOMMER ROMANO MD 8/8/2021 10:11 PM

## 2021-08-12 NOTE — DOCUMENTATION QUERY
[facility]                                                                       Query Response Note      PATIENT:               HARRY DAILEY  ACCT #:                  [id number]  MRN:                     [id number]  :                      1945  ADMIT DATE:       2021 3:09 AM  DISCH DATE:        2021 2:44 PM  RESPONDING  PROVIDER #:        [id number]           QUERY TEXT:    Dear Dr. Barnhart, Thank your for answering the previous documentation query. However, your answered did not include additional clinical indicators for acute respiratory failure. Please review this Documentation Query and reply below or respond with a new note.   Acute respiratory failure is documented in the Progress Notes. However, the clinical findings do not appear to support the diagnosis of acute respiratory failure. Please clarify the accuracy of this diagnosis.     Note: If an appropriate response is not listed below, please respond with a new note.    The patient's Clinical Indicators include:  Per Vital Sign Flowsheets  -RR 10-20  -SPO2 93-98%  -O2 2-4L via Oxymask    Per Progress Note   -Resp:  [10-18] 10   -SpO2:  [94 %-98 %] 97 %   -Respiratory:  Negative for cough and shortness of breath.   -Pulmonary:  Decreased bibasilar breath sounds, mild inspiratory Rales  -Acute respiratory failure with hypoxia  -Post op - 10-15L     Per Progress Note   -Currently on 2L oxygen.  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 s/p exploratory laparotomy with reduction of internal hernia & correction of volvulus, dehydration,  hypoglycemia, SIRS, hypokalemia, hypomagnesemia, & hx of chronic back pain    Thank You,  Mira Daniels RN BSN  Clinical   Connect via [employer]  Options provided:   -- Acute respiratory failure is a valid diagnosis for this admission, please document additional clinical indicators   -- Acute respiratory failure has been ruled out and amended documentation provided in the medical record   -- Unable to determine      Query created by: Mira Daniels on 8/10/2021 8:21 AM    RESPONSE TEXT:    Acute respiratory failure is a valid diagnosis for this admission -          Electronically signed by:  SOMMER ROMANO MD 8/11/2021 6:05 PM

## 2022-02-25 ENCOUNTER — OFFICE VISIT (OUTPATIENT)
Dept: URBAN - METROPOLITAN AREA CLINIC 91 | Facility: CLINIC | Age: 77
End: 2022-02-25
Payer: MEDICARE

## 2022-02-25 PROCEDURE — 99213 OFFICE O/P EST LOW 20 MIN: CPT | Performed by: OPHTHALMOLOGY

## 2022-02-25 ASSESSMENT — INTRAOCULAR PRESSURE
OS: 13
OD: 12

## 2022-02-25 ASSESSMENT — VISUAL ACUITY
OS: 20/20
OD: 20/20

## 2023-03-22 ENCOUNTER — OFFICE VISIT (OUTPATIENT)
Dept: URBAN - METROPOLITAN AREA CLINIC 91 | Facility: CLINIC | Age: 78
End: 2023-03-22
Payer: MEDICARE

## 2023-03-22 DIAGNOSIS — H35.033 HYPERTENSIVE RETINOPATHY, BILATERAL: Primary | ICD-10-CM

## 2023-03-22 DIAGNOSIS — H25.13 AGE-RELATED NUCLEAR CATARACT, BILATERAL: ICD-10-CM

## 2023-03-22 PROCEDURE — 92134 CPTRZ OPH DX IMG PST SGM RTA: CPT | Performed by: OPHTHALMOLOGY

## 2023-03-22 PROCEDURE — 99214 OFFICE O/P EST MOD 30 MIN: CPT | Performed by: OPHTHALMOLOGY

## 2023-03-22 ASSESSMENT — INTRAOCULAR PRESSURE
OD: 8
OS: 12

## 2023-06-20 ENCOUNTER — OFFICE VISIT (OUTPATIENT)
Dept: URBAN - METROPOLITAN AREA CLINIC 91 | Facility: CLINIC | Age: 78
End: 2023-06-20
Payer: MEDICARE

## 2023-06-20 DIAGNOSIS — H43.813 VITREOUS DEGENERATION, BILATERAL: ICD-10-CM

## 2023-06-20 DIAGNOSIS — H25.13 AGE-RELATED NUCLEAR CATARACT, BILATERAL: ICD-10-CM

## 2023-06-20 DIAGNOSIS — H35.033 HYPERTENSIVE RETINOPATHY, BILATERAL: Primary | ICD-10-CM

## 2023-06-20 PROCEDURE — 92134 CPTRZ OPH DX IMG PST SGM RTA: CPT | Performed by: OPHTHALMOLOGY

## 2023-06-20 PROCEDURE — 99214 OFFICE O/P EST MOD 30 MIN: CPT | Performed by: OPHTHALMOLOGY

## 2023-06-20 ASSESSMENT — INTRAOCULAR PRESSURE
OD: 8
OS: 9

## 2025-04-07 NOTE — DISCHARGE PLANNING
Problem: Breathing Pattern Ineffective  Goal: Air exchange is effective, demonstrated by Sp02 sat of greater then or = 92% (or as ordered)  Outcome: Monitoring/Evaluating progress  Goal: Respiratory pattern is quiet and regular without report of SOB  Outcome: Monitoring/Evaluating progress  Goal: Breathing pattern demonstrates minimal apnea during sleep with appropriate use of airway pressure support devices  Outcome: Monitoring/Evaluating progress     Problem: Pneumonia  Goal: S/S of acute pneumonia are resolved  Description: If acute pneumonia is present, monitor for resolution of fever, cough, secretions and other test values based on presentation.  Outcome: Monitoring/Evaluating progress     Problem: Skin Integrity Alteration  Goal: Skin remains intact with no new/deterioration of wound or pressure injury  Outcome: Monitoring/Evaluating progress  Goal: Participates in wound care activities  Outcome: Monitoring/Evaluating progress     Problem: At Risk for Falls  Goal: Patient does not fall  Outcome: Monitoring/Evaluating progress  Goal: Patient takes action to control fall-related risks  Outcome: Monitoring/Evaluating progress     Problem: At Risk for Injury Due to Fall  Goal: Patient does not fall  Outcome: Monitoring/Evaluating progress  Goal: Takes action to control condition specific risks  Outcome: Monitoring/Evaluating progress     Problem: Hemodialysis  Goal: Fistula/graft intact as evidenced by presence of bruit & thrill  Outcome: Monitoring/Evaluating progress  Goal: Vascular access device site free of signs & symptoms of infection  Outcome: Monitoring/Evaluating progress  Goal: Verbalizes understanding of hemodialysis care  Description: Document on Patient Education Activity  Outcome: Monitoring/Evaluating progress      Anticipated Discharge Disposition: Home with HH    Action: CM spoke with pt at bedside regarding HH referral. Pt agrees to referral for HH; choice provided ([facility]). Choice form sent to [facility].     Barriers to Discharge:   Medical clearance  HH acceptance    Plan: HCM will follow up on HH referral.

## (undated) DEVICE — DRAPE MAYO STAND - (30/CA)

## (undated) DEVICE — SLEEVE, VASO, THIGH, MED

## (undated) DEVICE — STAPLER SKIN DISP - (6/BX 10BX/CA) VISISTAT

## (undated) DEVICE — PROTECTOR ULNA NERVE - (36PR/CA)

## (undated) DEVICE — SET LEADWIRE 5 LEAD BEDSIDE DISPOSABLE ECG (1SET OF 5/EA)

## (undated) DEVICE — DRAPE LAPAROTOMY T SHEET - (12EA/CA)

## (undated) DEVICE — PACK MAJOR BASIN - (2EA/CA)

## (undated) DEVICE — TUBE E-T HI-LO CUFF 8.0MM (10EA/PK)

## (undated) DEVICE — HEAD HOLDER JUNIOR/ADULT

## (undated) DEVICE — SUTURE 1 PDS II PLUS TP-1 - (12PK/BX)

## (undated) DEVICE — GLOVE SZ 6.5 BIOGEL PI MICRO - PF LF (50PR/BX)

## (undated) DEVICE — KIT ANESTHESIA W/CIRCUIT & 3/LT BAG W/FILTER (20EA/CA)

## (undated) DEVICE — PAD LAP STERILE 18 X 18 - (5/PK 40PK/CA)

## (undated) DEVICE — GOWN WARMING STANDARD FLEX - (30/CA)

## (undated) DEVICE — BOVIE  BLADE 6 EXTENDED - (50/PK)

## (undated) DEVICE — DRESSING LEUKOMED STERILE 11.75X4IN - (50/CA)

## (undated) DEVICE — TUBE CONNECT SUCTION CLEAR 120 X 1/4" (50EA/CA)"

## (undated) DEVICE — SENSOR SPO2 NEO LNCS ADHESIVE (20/BX) SEE USER NOTES

## (undated) DEVICE — GLOVE BIOGEL SZ 7.5 SURGICAL PF LTX - (50PR/BX 4BX/CA)

## (undated) DEVICE — GLOVE BIOGEL PI INDICATOR SZ 7.0 SURGICAL PF LF - (50/BX 4BX/CA)

## (undated) DEVICE — GRAFT MESH SEPRAFILM - 10/BX

## (undated) DEVICE — MASK ANESTHESIA ADULT  - (100/CA)

## (undated) DEVICE — SUCTION INSTRUMENT YANKAUER BULBOUS TIP W/O VENT (50EA/CA)

## (undated) DEVICE — GOWN SURGEONS X-LARGE - DISP. (30/CA)

## (undated) DEVICE — SODIUM CHL IRRIGATION 0.9% 1000ML (12EA/CA)

## (undated) DEVICE — SUTURE 0 COATED VICRYL 6-18IN - (12PK/BX)

## (undated) DEVICE — NEPTUNE 4 PORT MANIFOLD - (20/PK)

## (undated) DEVICE — GOWN SURGICAL X-LARGE ULTRA - FILM-REINFORCED (20/CA)

## (undated) DEVICE — TUBING CLEARLINK DUO-VENT - C-FLO (48EA/CA)

## (undated) DEVICE — CANISTER SUCTION 3000ML MECHANICAL FILTER AUTO SHUTOFF MEDI-VAC NONSTERILE LF DISP  (40EA/CA)

## (undated) DEVICE — TOWELS CLOTH SURGICAL - (4/PK 20PK/CA)

## (undated) DEVICE — CHLORAPREP 26 ML APPLICATOR - ORANGE TINT(25/CA)

## (undated) DEVICE — TOWEL STOP TIMEOUT SAFETY FLAG (40EA/CA)

## (undated) DEVICE — SPONGE GAUZESTER 4 X 4 4PLY - (128PK/CA)

## (undated) DEVICE — GLOVE BIOGEL PI ORTHO SZ 8 PF LF (40PR/BX)

## (undated) DEVICE — LACTATED RINGERS INJ 1000 ML - (14EA/CA 60CA/PF)

## (undated) DEVICE — SET EXTENSION WITH 2 PORTS (48EA/CA) ***PART #2C8610 IS A SUBSTITUTE*****

## (undated) DEVICE — ELECTRODE DUAL RETURN W/ CORD - (50/PK)

## (undated) DEVICE — ELECTRODE 850 FOAM ADHESIVE - HYDROGEL RADIOTRNSPRNT (50/PK)

## (undated) DEVICE — SUTURE GENERAL

## (undated) DEVICE — SUTURE 1 VICRYL PLUS CTX - 8 X 18 INCH (12/BX)

## (undated) DEVICE — SUTURE 3-0 VICRYL PLUS SH - 8X 18 INCH (12/BX)